# Patient Record
Sex: MALE | Race: WHITE | Employment: UNEMPLOYED | URBAN - METROPOLITAN AREA
[De-identification: names, ages, dates, MRNs, and addresses within clinical notes are randomized per-mention and may not be internally consistent; named-entity substitution may affect disease eponyms.]

---

## 2021-10-03 ENCOUNTER — TRANSFERRED RECORDS (OUTPATIENT)
Dept: HEALTH INFORMATION MANAGEMENT | Facility: CLINIC | Age: 21
End: 2021-10-03
Payer: COMMERCIAL

## 2021-10-06 ENCOUNTER — TRANSFERRED RECORDS (OUTPATIENT)
Dept: HEALTH INFORMATION MANAGEMENT | Facility: CLINIC | Age: 21
End: 2021-10-06

## 2021-11-02 ENCOUNTER — TRANSFERRED RECORDS (OUTPATIENT)
Dept: HEALTH INFORMATION MANAGEMENT | Facility: CLINIC | Age: 21
End: 2021-11-02

## 2021-11-04 ENCOUNTER — TELEPHONE (OUTPATIENT)
Dept: OTOLARYNGOLOGY | Facility: CLINIC | Age: 21
End: 2021-11-04

## 2021-11-04 ENCOUNTER — CARE COORDINATION (OUTPATIENT)
Dept: OTOLARYNGOLOGY | Facility: CLINIC | Age: 21
End: 2021-11-04
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
## 2021-11-04 NOTE — TELEPHONE ENCOUNTER
Rene (father) confirmed that patient signed an DANY with Located within [facility] for recs to be released to himself, but it will take 10 days. Asked that I contact [facility] to expidete the release so that the patient can send it to me. Notified Rene that it might not work but we can try, however, I still need patient to sign an DANY in case this doesn't work. Rene asked that I email him ([email or web address]) so that he can send me the DANY that patient signed with [facility]. Received a call back from patient's cell phone, mom Robert, called stating ok to email patient at [email or web address].       3:08PM update:  Spoke with Jones, mom, notified that [email or web address] email provided bounced back. Mom provided [email or web address] [email or web address] or  [email or web address], asked that I send it to both as she is not sure which one it is. Provided Robert my direct number (ph. [phone])  and fax ([phone]), Robert will contact Dr Francisco's clinic to see if they can take a verbal and send us the records.       Records Requested  11/04/21 Pending DANY    Facility  [facility]   Dr. Elizabeth Francisco in ENT  [address], [place] [address]  Phone: [phone]  Fx. [phone]    Medical Records  [address] [address]  Ph. [phone]   Fx. [phone]   Outcome email sent to Rene (father) and patient for DANY

## 2021-11-09 NOTE — TELEPHONE ENCOUNTER
FUTURE VISIT INFORMATION      FUTURE VISIT INFORMATION:    Date: 2021    Time:   12:10PM    Location: [facility]  REFERRAL INFORMATION:    Referring provider:      Referring providers clinic:      Reason for visit/diagnosis  TM perforation on 10/1, referred by Dr. Mejias, seen at [facility]    RECORDS REQUESTED FROM:       Clinic name Comments Records Status Imaging Status   [facility]  Medical Records  [facility]  [address] [place] [address]  Ph. [phone]   Fx. [phone] 10/6/2021, 2021 notes from Dr Elizabeth Francisco   10/6/2021 audiogram  Pending DANY from patient - req 21 - received 21     [facility] 2021 note from Jennifer Seth MD   Care everywhere     [facility] Department (Main Dept.)  [address] [place] [address], [address]  [place] [address]  Phone: [phone]  Fax: [phone]  Email: [email or web address] 10/3/2021 ED note   10/3/2021 CT HEAD, CT Head and Facial Bone and CT Cervical Spine    *imaging trackin req 21 - recs received 11/15/21 req 21 - received 21 4PM no signed DANY from patient, called [facility] and spoke with medical records, they are able to release recs for cont' of care but it will take them a few days to get them to me. Will look out for it - Amay   2021 9:41AM received ENT recs, sent to scan. Called [facility] recs, they are the clinic not the hospital. Will need to request recs from the Hospital which is different fax number- Amay   11/15/2021 8:50AM recs received, sent to scan. Sent a 2nd fax for images to be mailed out - Amay   2021 1:24PM called and spoke with [facility], they will process imaging request today - Amay   2021 1:41PM imaging disc received, sent to upload - Amay

## 2021-11-11 NOTE — PROGRESS NOTES
Spoke to patient's father regarding appointment with Dr. Sanders. Patient is set up for 11/19 - audiogram at 10:30 and visit with Dr. Sanders at 12:10 pm. Informed that we received ENT records from [facility]. We are still waiting on other records; from ED and imaging. No further questions at this time.    Angy Elliott RN on 11/11/2021 at 11:01 AM

## 2021-11-16 ENCOUNTER — PRE VISIT (OUTPATIENT)
Dept: OTOLARYNGOLOGY | Facility: CLINIC | Age: 21
End: 2021-11-16
Payer: COMMERCIAL
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
## 2021-11-19 ENCOUNTER — TELEPHONE (OUTPATIENT)
Dept: OTOLARYNGOLOGY | Facility: CLINIC | Age: 21
End: 2021-11-19

## 2021-11-19 ENCOUNTER — OFFICE VISIT (OUTPATIENT)
Dept: AUDIOLOGY | Facility: CLINIC | Age: 21
End: 2021-11-19
Payer: COMMERCIAL

## 2021-11-19 ENCOUNTER — OFFICE VISIT (OUTPATIENT)
Dept: OTOLARYNGOLOGY | Facility: CLINIC | Age: 21
End: 2021-11-19
Payer: COMMERCIAL

## 2021-11-19 DIAGNOSIS — H72.90 TYMPANIC MEMBRANE PERFORATION: Primary | ICD-10-CM

## 2021-11-19 DIAGNOSIS — Z01.10 ENCOUNTER FOR HEARING TEST: ICD-10-CM

## 2021-11-19 DIAGNOSIS — S09.21XA TRAUMATIC TYMPANIC MEMBRANE PERFORATION, RIGHT, INITIAL ENCOUNTER: Primary | ICD-10-CM

## 2021-11-19 PROCEDURE — 92557 COMPREHENSIVE HEARING TEST: CPT | Performed by: AUDIOLOGIST

## 2021-11-19 PROCEDURE — 99204 OFFICE O/P NEW MOD 45 MIN: CPT | Mod: 25 | Performed by: OTOLARYNGOLOGY

## 2021-11-19 PROCEDURE — 92504 EAR MICROSCOPY EXAMINATION: CPT | Performed by: OTOLARYNGOLOGY

## 2021-11-19 PROCEDURE — 92565 STENGER TEST PURE TONE: CPT | Performed by: AUDIOLOGIST

## 2021-11-19 PROCEDURE — 92550 TYMPANOMETRY & REFLEX THRESH: CPT | Mod: 52 | Performed by: AUDIOLOGIST

## 2021-11-19 ASSESSMENT — PAIN SCALES - GENERAL: PAINLEVEL: NO PAIN (0)
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
## 2021-11-19 NOTE — PROGRESS NOTES
AUDIOLOGY REPORT    SUMMARY: Audiology visit completed. See audiogram for results.      RECOMMENDATIONS: Follow-up with ENT.      Neri Tyson.  Licensed Audiologist  [place] #[id number]

## 2021-11-19 NOTE — PATIENT INSTRUCTIONS
1. You were seen in the ENT Clinic today by Dr. Sanders.  If you have any questions or concerns after your appointment, please call   - Option 1: ENT Clinic: [phone]   - Option 2: Rhonda (Dr. Sanders' Nurse): [phone]                    Mckenna(Dr. Sanders' Nurse): [phone]      2.   Plan to return to clinic in March with hearing test    Rhonda Zapata LPN  [facility] - Otolaryngology

## 2021-11-19 NOTE — NURSING NOTE
Chief Complaint   Patient presents with     Consult     tympanic perforation -right ear      There were no vitals taken for this visit.    Jaswant Reyes LPN

## 2022-07-08 ENCOUNTER — TELEPHONE (OUTPATIENT)
Dept: OTOLARYNGOLOGY | Facility: CLINIC | Age: 22
End: 2022-07-08

## 2022-07-08 NOTE — TELEPHONE ENCOUNTER
Spoke with pt's mother regarding possible surgery dates for pt wanting tympanoplasty. Mother states that pt currently is scheduled in [place], [place] for this procedure, and she is really wanting him to come to [place] for the procedure. Reviewed Dr. Sanders' last office visit that stated pt needed to wait at least 6 months before considering surgery due to concussion sx at the time. Mother states that pt has been doing much better with these sx, and now one of the most bothersome sx is that he cannot hear in the perforated ear.     Pt scheduled for 8/12 appt with audiogram. Writer to check with MD and surgery scheduler for surgery availability in August or early September for tympanoplasty. Writer will callback mother on 7/20 or 7/21 to further discuss these dates, and confirm that Dr. Sanders is also OK with this plan. CB number provided if she has any further questions prior to our phone conversation.

## 2022-07-08 NOTE — TELEPHONE ENCOUNTER
[facility] Call Center    Phone Message    May a detailed message be left on voicemail: yes     Reason for Call: Other: Pt's mother is requesting a call back from  or her nurse regarding a few questions and concerns about a raptured typanic membrane. Please give pt's mother a call back to help assist with questions. Thank You!     Action Taken: Message routed to:  [facility] ([facility] ent    Travel Screening: Not Applicable

## 2022-07-13 NOTE — TELEPHONE ENCOUNTER
Spoke with mother that next available surgery dates are 9/21 or 10/5. She states that pt has decided to do surgery in [place], and these dates are too far out for him anyway. She states appreciation for the call and attempting to get the patient in. Confirms cancellation of 8/12 appts. No further questions, they will call back if pt decides he wants surgery with Dr. Sanders.

## 2022-08-09 ENCOUNTER — TELEPHONE (OUTPATIENT)
Dept: OTOLARYNGOLOGY | Facility: CLINIC | Age: 22
End: 2022-08-09

## 2022-08-12 NOTE — TELEPHONE ENCOUNTER
Called pt's mom. Father is not listed on the chart as authorized to talk to. Called mom as pt has an appt this month. Dr. Sanders is booked out until the end of September for surgeries so unfortunately we would not be able to schedule any sooner. Will discuss with mother when she calls back.    Rhonda Zapata LPN

## 2022-08-12 NOTE — TELEPHONE ENCOUNTER
[facility] Call Center    Phone Message    May a detailed message be left on voicemail: yes     Reason for Call: Pt's father calling back for status of message dated 08/09/22, states clinic didn't reach out. please advise.     Action Taken: Message routed to:  [facility] ([facility] ENT    Travel Screening: Not Applicable

## 2022-08-12 NOTE — TELEPHONE ENCOUNTER
Spoke to mom- Ok to talk to Rene(father) but we will need a release signed at the visit in September. I let her know we still have the appt on 9/27 with Dr. Sanders. At that visit we talk about surgery dates. No further questions.    Rhonda Zapata LPN

## 2022-09-09 DIAGNOSIS — S09.21XA TRAUMATIC TYMPANIC MEMBRANE PERFORATION, RIGHT, INITIAL ENCOUNTER: Primary | ICD-10-CM

## 2022-09-13 ENCOUNTER — TELEPHONE (OUTPATIENT)
Dept: OTOLARYNGOLOGY | Facility: CLINIC | Age: 22
End: 2022-09-13

## 2022-09-13 NOTE — TELEPHONE ENCOUNTER
[facility] Call Center    Phone Message    May a detailed message be left on voicemail: yes     Reason for Call: Order(s): Other: Surgery orders for ear  Reason for requested: Jones would like to get surgery scheduled as soon as possible since Dr. Cobos's surgery schedule is booking a ways out.  Date needed: Dr. Sanders  Provider name: when possible.      Action Taken: Message routed to:  [facility] ([facility] ENT    Travel Screening: Not Applicable

## 2022-09-13 NOTE — TELEPHONE ENCOUNTER
Spoke to pt's mom and we are not able to put in surgery order until we see him. We would need to re-exam the ear to determine the surgery and insurance will need to have a recent visit for insurance approval. Mom voiced understanding. No further questions.    Rhonda Zapata LPN
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
## 2022-09-27 ENCOUNTER — TELEPHONE (OUTPATIENT)
Dept: OTOLARYNGOLOGY | Facility: CLINIC | Age: 22
End: 2022-09-27

## 2022-09-27 ENCOUNTER — PREP FOR PROCEDURE (OUTPATIENT)
Dept: OTOLARYNGOLOGY | Facility: CLINIC | Age: 22
End: 2022-09-27

## 2022-09-27 ENCOUNTER — OFFICE VISIT (OUTPATIENT)
Dept: OTOLARYNGOLOGY | Facility: CLINIC | Age: 22
End: 2022-09-27

## 2022-09-27 ENCOUNTER — OFFICE VISIT (OUTPATIENT)
Dept: AUDIOLOGY | Facility: CLINIC | Age: 22
End: 2022-09-27
Payer: COMMERCIAL

## 2022-09-27 VITALS
TEMPERATURE: 98.9 F | DIASTOLIC BLOOD PRESSURE: 65 MMHG | SYSTOLIC BLOOD PRESSURE: 143 MMHG | OXYGEN SATURATION: 98 % | HEART RATE: 71 BPM

## 2022-09-27 DIAGNOSIS — H90.11 CONDUCTIVE HEARING LOSS OF RIGHT EAR WITH UNRESTRICTED HEARING OF LEFT EAR: Primary | ICD-10-CM

## 2022-09-27 DIAGNOSIS — H90.11 CONDUCTIVE HEARING LOSS OF RIGHT EAR WITH UNRESTRICTED HEARING OF LEFT EAR: ICD-10-CM

## 2022-09-27 DIAGNOSIS — S09.21XA TRAUMATIC TYMPANIC MEMBRANE PERFORATION, RIGHT, INITIAL ENCOUNTER: Primary | ICD-10-CM

## 2022-09-27 DIAGNOSIS — H72.90 PERFORATION OF TYMPANIC MEMBRANE, UNSPECIFIED LATERALITY: Primary | ICD-10-CM

## 2022-09-27 PROCEDURE — 92557 COMPREHENSIVE HEARING TEST: CPT | Performed by: AUDIOLOGIST

## 2022-09-27 PROCEDURE — 99215 OFFICE O/P EST HI 40 MIN: CPT | Mod: 25 | Performed by: OTOLARYNGOLOGY

## 2022-09-27 PROCEDURE — 92550 TYMPANOMETRY & REFLEX THRESH: CPT | Mod: 52 | Performed by: AUDIOLOGIST

## 2022-09-27 PROCEDURE — 92504 EAR MICROSCOPY EXAMINATION: CPT | Mod: GC | Performed by: OTOLARYNGOLOGY

## 2022-09-27 RX ORDER — IBUPROFEN 800 MG/1
800 TABLET, FILM COATED ORAL 3 TIMES DAILY PRN
COMMUNITY
Start: 2022-09-23 | End: 2023-01-10

## 2022-09-27 RX ORDER — CEFUROXIME SODIUM 1.5 G/16ML
1.5 INJECTION, POWDER, FOR SOLUTION INTRAVENOUS
Status: CANCELLED | OUTPATIENT
Start: 2022-09-27

## 2022-09-27 RX ORDER — HYDROXYZINE PAMOATE 25 MG/1
CAPSULE ORAL
COMMUNITY
Start: 2022-09-23 | End: 2022-11-21

## 2022-09-27 RX ORDER — DEXAMETHASONE SODIUM PHOSPHATE 4 MG/ML
10 INJECTION, SOLUTION INTRA-ARTICULAR; INTRALESIONAL; INTRAMUSCULAR; INTRAVENOUS; SOFT TISSUE ONCE
Status: CANCELLED | OUTPATIENT
Start: 2022-09-27 | End: 2022-09-27

## 2022-09-27 RX ORDER — OXYCODONE HYDROCHLORIDE 5 MG/1
TABLET ORAL
COMMUNITY
Start: 2022-09-23 | End: 2022-11-21

## 2022-09-27 RX ORDER — CEFUROXIME SODIUM 1.5 G/16ML
1.5 INJECTION, POWDER, FOR SOLUTION INTRAVENOUS SEE ADMIN INSTRUCTIONS
Status: CANCELLED | OUTPATIENT
Start: 2022-09-27

## 2022-09-27 ASSESSMENT — PAIN SCALES - GENERAL: PAINLEVEL: NO PAIN (0)

## 2022-09-27 NOTE — TELEPHONE ENCOUNTER
Records Requested  09/27/22    Facility  Dr. Sabino Tomas MD - Ear, Nose & Throat  [address], [place] [address]  Phone: [phone] (Fax)  MEd recs fx, [phone]   Outcome Called facility, left a message for a fax number for medical records - [facility]        Records Requested  09/27/22    Facility  [facility] Department (Main Dept.)  [address] [place] [address], [address]  [place] [address]  Phone: [phone]  Fax: [phone]   Outcome Sent a fax for op report from Dr Tomas    9/28/22 received recs, sent to scan. Copy emailed to Rhonda Elise
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
## 2022-09-27 NOTE — NURSING NOTE
Chief Complaint   Patient presents with     RECHECK     Surgical consult  TM repair . Unable to take weight discuss . Has Crutches and leg brace     Blood pressure (!) 143/65, pulse 71, temperature 98.9  F (37.2  C), SpO2 98 %.    .  Jaswant Reyes LPN

## 2022-09-27 NOTE — PATIENT INSTRUCTIONS
1. You were seen in the ENT Clinic today by Dr. Sanders.  If you have any questions or concerns after your appointment, please call   - Option 1: ENT Clinic: [phone]   - Option 2: Rhonda (Dr. Sanders' Nurse): [phone]                    (Dr. Sanders' Nurse): [phone]      2.   Plan to return to clinic 3 weeks post    How to Contact Us:  Send a [employer] message to your provider. Our team will respond to you via [employer]. Occasionally, we will need to call you to get further information.  For urgent matters (Monday-Friday), call the ENT Clinic: [phone] and speak with a call center team member - they will route your call appropriately.   If you'd like to speak directly with a nurse, please find our contact information below. We do our best to check voicemail frequently throughout the day, and will work to call you back within 1-2 days. For urgent matters, please use the general clinic phone numbers listed above.       Rhonda Zapata LPN  [facility] - Otolaryngology     Surgery Teaching    1. Someone from our scheduling department will call you within approximately one week to get you scheduled with your provider for surgery. If no one has called you in one week, please notify us.    2. You must have a physical exam (called  history and physical ) within 30 days of surgery. You may complete this with your primary care provider.   A. If your provider is outside of the [facility] network please have them complete the preoperative forms provided to you in the surgery packet you will be mailed and be sure to have your provider fax them to the appropriate location prior to surgery. For surgery at the [facility] the fax number [phone]. For surgery at the [facility] the fax number is [phone].  B. In some cases we may have you see our Preoperative Assessment Center. If we have expressed this to you, our  will set up your appointment with them when they call to set up your surgery.    3. If you're going home on  "the same day as your procedure (surgery):  1 to 2 days before your procedure, take an at-home, rapid antigen test. You can buy these at many pharmacy stores. Or you can order free, at-home tests at [employer]/tests. If you can't find an at-home, rapid antigen test, please schedule a PCR test with [facility] by calling [employer], or visiting [employer]/covid19. We can't accept tests that are more than 4 days old.  If your test is negative, please take a photo of the test. Show the photo to the nurse when you come in for your procedure.  If your test is positive, please see the \"If your test shows you have COVID-19\" section on this page        If you're staying overnight in the hospital:4 days before your procedure, please get a PCR test from a lab.  To schedule a PCR test with [facility], call [phone]. Or, visit   [employer]/resources/covid19.  If your test is negative, please ask the testing location to fax your results to us at [phone].  If your test is positive, please tell your surgeon's office right away. A positive test means that you have COVID-19. We'll probably have to postpone your admission, surgery or procedure. Your care team will discuss this with you. After that, we'll let you know what to do and when you can re-schedule.    4. For same-day surgery, you must arrange for an adult to take you home from the Center. An adult must stay with you for the first 24 hours after surgery. You cannot drive for 24 hours.     5. Ask your doctor what medicines are safe before surgery. For over the counter medications and supplements it is advised that you do NOT TAKE MOTRIN, IBUPROFEN, ASPIRIN, ALEVE, GARLIC SUPPLEMENTS or FISH OIL x 7 days prior to surgery (to prevent excess bleeding and bruising at time of surgery). If your provider advises you to take any medication the morning of surgery you should take this with a sip of water.    6. A few days prior to " surgery a nurse will call you to review your health history and instructions for before and after surgery. They will give you your final arrival time based upon your scheduled arrival time for surgery.    7. Call the surgical team if there's any change in your health prior to surgery. Things you should call for include but are not limited to signs of a cold or the flu (sore throat, runny nose, cough, rash, fever). Other things to notify them for is for any open wounds (cuts, scrapes, scratches) near to the surgery site.    8. If you drink alcohol, stop drinking alcohol at least 24 hours before surgery.    9. If you smoke, stop or at least cut down on smoking 24 hours before surgery.    10.Take a bath or shower the night before and the morning of surgery (as told by your surgeon). Use an antiseptic soap. If your doctor does not give you special soap, buy Hibiclens or Elina-Stat at the drug store or ask the pharmacist to suggest a brand. You will wash with this from the neck down, washing your hair and face as you would normally.   A. When you are done with your shower please be sure to use clean towels to dry with, have clean linens on your bed, and put on clean clothes each time.   B. DO NOT put on lotion, powder, perfume, deodorant or make-up after bathing.    11. You can eat a normal meal the night before surgery. Do not eat any solid foods or drink any milk products for 8 hours before surgery.     12. You may drink clear liquids until 2 hours before surgery. Clear liquids include water, Gatorade, apple juice and liquids you can see through.    13. No eating or drinking 2 hours prior to surgery until after surgery. Your post op team will review any diet limitations you might have and when you can start eating and drinking again after surgery.

## 2022-09-27 NOTE — PROGRESS NOTES
AUDIOLOGY REPORT    SUMMARY: Audiology visit completed. See audiogram for results.      RECOMMENDATIONS: Follow-up with ENT.      Neri Tyson.  Licensed Audiologist  [place] #[id number]

## 2022-10-03 ENCOUNTER — TELEPHONE (OUTPATIENT)
Dept: OTOLARYNGOLOGY | Facility: CLINIC | Age: 22
End: 2022-10-03

## 2022-10-03 DIAGNOSIS — S09.21XA TRAUMATIC TYMPANIC MEMBRANE PERFORATION, RIGHT, INITIAL ENCOUNTER: Primary | ICD-10-CM

## 2022-10-03 NOTE — TELEPHONE ENCOUNTER
Called his dad to let him know we would need to get a CT scan done before surgery. Gave phone number to schedule. Dad was really pushing to get surgery very soon. I explained we will call as soon as we can as there is a lot of coordination needs to happen. No further questions.    Rhonda Zapata LPN

## 2022-10-06 ENCOUNTER — TELEPHONE (OUTPATIENT)
Dept: OTOLARYNGOLOGY | Facility: CLINIC | Age: 22
End: 2022-10-06

## 2022-10-06 NOTE — TELEPHONE ENCOUNTER
Left patient a voicemail to schedule Left postauricular revision tympanoplasty (Left)  with Dr. Tommy Marshall on 10/6/2022 at 2:49 PM

## 2022-10-07 ENCOUNTER — ANCILLARY PROCEDURE (OUTPATIENT)
Dept: CT IMAGING | Facility: CLINIC | Age: 22
End: 2022-10-07
Attending: OTOLARYNGOLOGY
Payer: COMMERCIAL

## 2022-10-07 DIAGNOSIS — S09.21XA TRAUMATIC TYMPANIC MEMBRANE PERFORATION, RIGHT, INITIAL ENCOUNTER: ICD-10-CM

## 2022-10-07 PROBLEM — H72.90 PERFORATION OF TYMPANIC MEMBRANE, UNSPECIFIED LATERALITY: Status: ACTIVE | Noted: 2022-10-07

## 2022-10-07 PROCEDURE — 70480 CT ORBIT/EAR/FOSSA W/O DYE: CPT

## 2022-10-07 NOTE — TELEPHONE ENCOUNTER
Called patient to schedule surgery with Dr. Sanders    Date of Surgery: 12/12    Location of surgery: [facility]    Pre-Op H&P: PCP    Pre/Post Imaging:  Not Applicable    Discussed COVID-19 Testing: Yes    Post-Op Appt Date: 3 weeks    Surgery Packet Mailed: 10/10      Additional comments: NETTIE Marshall on 10/7/2022 at 10:57 AM

## 2022-10-10 NOTE — TELEPHONE ENCOUNTER
Rescheduled patients surgery to 11/23. Patient needs 3 week post op    Jocelyn Marshall, Surgery Scheduler 10/10/2022 at 4:05 PM

## 2022-11-01 ENCOUNTER — TELEPHONE (OUTPATIENT)
Dept: OTOLARYNGOLOGY | Facility: CLINIC | Age: 22
End: 2022-11-01

## 2022-11-23 ENCOUNTER — HOSPITAL ENCOUNTER (OUTPATIENT)
Facility: AMBULATORY SURGERY CENTER | Age: 22
Discharge: HOME | End: 2022-11-23
Attending: OTOLARYNGOLOGY | Admitting: OTOLARYNGOLOGY
Payer: COMMERCIAL

## 2022-11-23 ENCOUNTER — ANESTHESIA EVENT (OUTPATIENT)
Dept: SURGERY | Facility: AMBULATORY SURGERY CENTER | Age: 22
End: 2022-11-23
Payer: COMMERCIAL

## 2022-11-23 ENCOUNTER — ANESTHESIA (OUTPATIENT)
Dept: SURGERY | Facility: AMBULATORY SURGERY CENTER | Age: 22
End: 2022-11-23
Payer: COMMERCIAL

## 2022-11-23 VITALS
WEIGHT: 185 LBS | DIASTOLIC BLOOD PRESSURE: 71 MMHG | TEMPERATURE: 97.9 F | HEART RATE: 70 BPM | RESPIRATION RATE: 16 BRPM | OXYGEN SATURATION: 97 % | HEIGHT: 75 IN | BODY MASS INDEX: 23 KG/M2 | SYSTOLIC BLOOD PRESSURE: 137 MMHG

## 2022-11-23 DIAGNOSIS — H72.90 PERFORATION OF TYMPANIC MEMBRANE, UNSPECIFIED LATERALITY: ICD-10-CM

## 2022-11-23 DIAGNOSIS — G89.18 POST-OP PAIN: Primary | ICD-10-CM

## 2022-11-23 PROCEDURE — 69631 REPAIR EARDRUM STRUCTURES: CPT | Mod: RT

## 2022-11-23 PROCEDURE — 69631 REPAIR EARDRUM STRUCTURES: CPT | Mod: 22 | Performed by: OTOLARYNGOLOGY

## 2022-11-23 RX ORDER — LIDOCAINE HYDROCHLORIDE AND EPINEPHRINE 10; 10 MG/ML; UG/ML
INJECTION, SOLUTION INFILTRATION; PERINEURAL PRN
Status: DISCONTINUED | OUTPATIENT
Start: 2022-11-23 | End: 2022-11-23 | Stop reason: HOSPADM

## 2022-11-23 RX ORDER — ACETAMINOPHEN 325 MG/1
975 TABLET ORAL ONCE
Status: DISCONTINUED | OUTPATIENT
Start: 2022-11-23 | End: 2022-11-24 | Stop reason: HOSPADM

## 2022-11-23 RX ORDER — HYDROCODONE BITARTRATE AND ACETAMINOPHEN 5; 325 MG/1; MG/1
1 TABLET ORAL
Status: COMPLETED | OUTPATIENT
Start: 2022-11-23 | End: 2022-11-23

## 2022-11-23 RX ORDER — PROPOFOL 10 MG/ML
INJECTION, EMULSION INTRAVENOUS CONTINUOUS PRN
Status: DISCONTINUED | OUTPATIENT
Start: 2022-11-23 | End: 2022-11-23

## 2022-11-23 RX ORDER — DEXMEDETOMIDINE HYDROCHLORIDE 4 UG/ML
INJECTION, SOLUTION INTRAVENOUS PRN
Status: DISCONTINUED | OUTPATIENT
Start: 2022-11-23 | End: 2022-11-23

## 2022-11-23 RX ORDER — ONDANSETRON 4 MG/1
4 TABLET, ORALLY DISINTEGRATING ORAL EVERY 8 HOURS PRN
Qty: 4 TABLET | Refills: 0 | Status: SHIPPED | OUTPATIENT
Start: 2022-11-23 | End: 2022-12-13

## 2022-11-23 RX ORDER — FENTANYL CITRATE 50 UG/ML
50 INJECTION, SOLUTION INTRAMUSCULAR; INTRAVENOUS EVERY 5 MIN PRN
Status: DISCONTINUED | OUTPATIENT
Start: 2022-11-23 | End: 2022-11-24 | Stop reason: HOSPADM

## 2022-11-23 RX ORDER — LIDOCAINE HYDROCHLORIDE 20 MG/ML
INJECTION, SOLUTION INFILTRATION; PERINEURAL PRN
Status: DISCONTINUED | OUTPATIENT
Start: 2022-11-23 | End: 2022-11-23

## 2022-11-23 RX ORDER — CIPROFLOXACIN AND DEXAMETHASONE 3; 1 MG/ML; MG/ML
SUSPENSION/ DROPS AURICULAR (OTIC) PRN
Status: DISCONTINUED | OUTPATIENT
Start: 2022-11-23 | End: 2022-11-23 | Stop reason: HOSPADM

## 2022-11-23 RX ORDER — FENTANYL CITRATE 50 UG/ML
25 INJECTION, SOLUTION INTRAMUSCULAR; INTRAVENOUS EVERY 5 MIN PRN
Status: DISCONTINUED | OUTPATIENT
Start: 2022-11-23 | End: 2022-11-24 | Stop reason: HOSPADM

## 2022-11-23 RX ORDER — ONDANSETRON 4 MG/1
4 TABLET, ORALLY DISINTEGRATING ORAL EVERY 30 MIN PRN
Status: DISCONTINUED | OUTPATIENT
Start: 2022-11-23 | End: 2022-11-24 | Stop reason: HOSPADM

## 2022-11-23 RX ORDER — HYDROCODONE BITARTRATE AND ACETAMINOPHEN 5; 325 MG/1; MG/1
1 TABLET ORAL EVERY 6 HOURS PRN
Qty: 30 TABLET | Refills: 0 | Status: SHIPPED | OUTPATIENT
Start: 2022-11-23 | End: 2022-12-13

## 2022-11-23 RX ORDER — HYDROCODONE BITARTRATE AND ACETAMINOPHEN 5; 325 MG/1; MG/1
1 TABLET ORAL EVERY 6 HOURS PRN
Qty: 12 TABLET | Refills: 0 | Status: SHIPPED | OUTPATIENT
Start: 2022-11-23 | End: 2022-11-23

## 2022-11-23 RX ORDER — MEPERIDINE HYDROCHLORIDE 25 MG/ML
12.5 INJECTION INTRAMUSCULAR; INTRAVENOUS; SUBCUTANEOUS
Status: DISCONTINUED | OUTPATIENT
Start: 2022-11-23 | End: 2022-11-24 | Stop reason: HOSPADM

## 2022-11-23 RX ORDER — CEFUROXIME SODIUM 1.5 G/16ML
1.5 INJECTION, POWDER, FOR SOLUTION INTRAVENOUS
Status: COMPLETED | OUTPATIENT
Start: 2022-11-23 | End: 2022-11-23

## 2022-11-23 RX ORDER — DEXAMETHASONE SODIUM PHOSPHATE 10 MG/ML
10 INJECTION, SOLUTION INTRAMUSCULAR; INTRAVENOUS ONCE
Status: COMPLETED | OUTPATIENT
Start: 2022-11-23 | End: 2022-11-23

## 2022-11-23 RX ORDER — SODIUM CHLORIDE, SODIUM LACTATE, POTASSIUM CHLORIDE, CALCIUM CHLORIDE 600; 310; 30; 20 MG/100ML; MG/100ML; MG/100ML; MG/100ML
INJECTION, SOLUTION INTRAVENOUS CONTINUOUS PRN
Status: DISCONTINUED | OUTPATIENT
Start: 2022-11-23 | End: 2022-11-23

## 2022-11-23 RX ORDER — ONDANSETRON 2 MG/ML
4 INJECTION INTRAMUSCULAR; INTRAVENOUS EVERY 30 MIN PRN
Status: DISCONTINUED | OUTPATIENT
Start: 2022-11-23 | End: 2022-11-24 | Stop reason: HOSPADM

## 2022-11-23 RX ORDER — HYDROMORPHONE HYDROCHLORIDE 1 MG/ML
0.2 INJECTION, SOLUTION INTRAMUSCULAR; INTRAVENOUS; SUBCUTANEOUS EVERY 5 MIN PRN
Status: DISCONTINUED | OUTPATIENT
Start: 2022-11-23 | End: 2022-11-24 | Stop reason: HOSPADM

## 2022-11-23 RX ORDER — HYDROMORPHONE HYDROCHLORIDE 1 MG/ML
0.4 INJECTION, SOLUTION INTRAMUSCULAR; INTRAVENOUS; SUBCUTANEOUS EVERY 5 MIN PRN
Status: DISCONTINUED | OUTPATIENT
Start: 2022-11-23 | End: 2022-11-24 | Stop reason: HOSPADM

## 2022-11-23 RX ORDER — CEFUROXIME SODIUM 1.5 G/16ML
1.5 INJECTION, POWDER, FOR SOLUTION INTRAVENOUS SEE ADMIN INSTRUCTIONS
Status: DISCONTINUED | OUTPATIENT
Start: 2022-11-23 | End: 2022-11-23 | Stop reason: HOSPADM

## 2022-11-23 RX ORDER — ONDANSETRON 2 MG/ML
INJECTION INTRAMUSCULAR; INTRAVENOUS PRN
Status: DISCONTINUED | OUTPATIENT
Start: 2022-11-23 | End: 2022-11-23

## 2022-11-23 RX ORDER — HALOPERIDOL 5 MG/ML
1 INJECTION INTRAMUSCULAR
Status: DISCONTINUED | OUTPATIENT
Start: 2022-11-23 | End: 2022-11-24 | Stop reason: HOSPADM

## 2022-11-23 RX ORDER — AMOXICILLIN 250 MG
1-2 CAPSULE ORAL 2 TIMES DAILY
Qty: 30 TABLET | Refills: 0 | Status: SHIPPED | OUTPATIENT
Start: 2022-11-23 | End: 2023-01-10

## 2022-11-23 RX ORDER — FENTANYL CITRATE 50 UG/ML
INJECTION, SOLUTION INTRAMUSCULAR; INTRAVENOUS PRN
Status: DISCONTINUED | OUTPATIENT
Start: 2022-11-23 | End: 2022-11-23

## 2022-11-23 RX ORDER — PROPOFOL 10 MG/ML
INJECTION, EMULSION INTRAVENOUS PRN
Status: DISCONTINUED | OUTPATIENT
Start: 2022-11-23 | End: 2022-11-23

## 2022-11-23 RX ORDER — CEFUROXIME AXETIL 250 MG/1
250 TABLET ORAL 2 TIMES DAILY
Qty: 14 TABLET | Refills: 0 | Status: SHIPPED | OUTPATIENT
Start: 2022-11-23 | End: 2022-11-30

## 2022-11-23 RX ORDER — ALBUTEROL SULFATE 0.83 MG/ML
2.5 SOLUTION RESPIRATORY (INHALATION) EVERY 4 HOURS PRN
Status: DISCONTINUED | OUTPATIENT
Start: 2022-11-23 | End: 2022-11-24 | Stop reason: HOSPADM

## 2022-11-23 RX ADMIN — PROPOFOL 50 MG: 10 INJECTION, EMULSION INTRAVENOUS at 07:27

## 2022-11-23 RX ADMIN — CEFUROXIME SODIUM 1.5 G: 1.5 INJECTION, POWDER, FOR SOLUTION INTRAVENOUS at 07:30

## 2022-11-23 RX ADMIN — FENTANYL CITRATE 50 MCG: 50 INJECTION, SOLUTION INTRAMUSCULAR; INTRAVENOUS at 13:06

## 2022-11-23 RX ADMIN — DEXMEDETOMIDINE HYDROCHLORIDE 8 MCG: 4 INJECTION, SOLUTION INTRAVENOUS at 11:28

## 2022-11-23 RX ADMIN — FENTANYL CITRATE 50 MCG: 50 INJECTION, SOLUTION INTRAMUSCULAR; INTRAVENOUS at 13:12

## 2022-11-23 RX ADMIN — HYDROMORPHONE HYDROCHLORIDE 0.2 MG: 1 INJECTION, SOLUTION INTRAMUSCULAR; INTRAVENOUS; SUBCUTANEOUS at 13:38

## 2022-11-23 RX ADMIN — PROPOFOL 200 MG: 10 INJECTION, EMULSION INTRAVENOUS at 07:25

## 2022-11-23 RX ADMIN — PROPOFOL 50 MG: 10 INJECTION, EMULSION INTRAVENOUS at 12:32

## 2022-11-23 RX ADMIN — LIDOCAINE HYDROCHLORIDE 100 MG: 20 INJECTION, SOLUTION INFILTRATION; PERINEURAL at 07:24

## 2022-11-23 RX ADMIN — DEXMEDETOMIDINE HYDROCHLORIDE 8 MCG: 4 INJECTION, SOLUTION INTRAVENOUS at 07:50

## 2022-11-23 RX ADMIN — PROPOFOL 50 MG: 10 INJECTION, EMULSION INTRAVENOUS at 07:26

## 2022-11-23 RX ADMIN — FENTANYL CITRATE 25 MCG: 50 INJECTION, SOLUTION INTRAMUSCULAR; INTRAVENOUS at 07:51

## 2022-11-23 RX ADMIN — SODIUM CHLORIDE, SODIUM LACTATE, POTASSIUM CHLORIDE, CALCIUM CHLORIDE: 600; 310; 30; 20 INJECTION, SOLUTION INTRAVENOUS at 11:30

## 2022-11-23 RX ADMIN — FENTANYL CITRATE 50 MCG: 50 INJECTION, SOLUTION INTRAMUSCULAR; INTRAVENOUS at 08:05

## 2022-11-23 RX ADMIN — Medication 0.5 MG: at 12:09

## 2022-11-23 RX ADMIN — PROPOFOL 40 MG: 10 INJECTION, EMULSION INTRAVENOUS at 12:04

## 2022-11-23 RX ADMIN — PROPOFOL 200 MCG/KG/MIN: 10 INJECTION, EMULSION INTRAVENOUS at 07:26

## 2022-11-23 RX ADMIN — HYDROMORPHONE HYDROCHLORIDE 0.2 MG: 1 INJECTION, SOLUTION INTRAMUSCULAR; INTRAVENOUS; SUBCUTANEOUS at 13:27

## 2022-11-23 RX ADMIN — PROPOFOL 40 MG: 10 INJECTION, EMULSION INTRAVENOUS at 09:33

## 2022-11-23 RX ADMIN — SODIUM CHLORIDE, SODIUM LACTATE, POTASSIUM CHLORIDE, CALCIUM CHLORIDE: 600; 310; 30; 20 INJECTION, SOLUTION INTRAVENOUS at 06:37

## 2022-11-23 RX ADMIN — PROPOFOL 50 MG: 10 INJECTION, EMULSION INTRAVENOUS at 07:28

## 2022-11-23 RX ADMIN — HYDROCODONE BITARTRATE AND ACETAMINOPHEN 1 TABLET: 5; 325 TABLET ORAL at 13:39

## 2022-11-23 RX ADMIN — CEFUROXIME SODIUM 1.5 G: 1.5 INJECTION, POWDER, FOR SOLUTION INTRAVENOUS at 11:30

## 2022-11-23 RX ADMIN — DEXMEDETOMIDINE HYDROCHLORIDE 8 MCG: 4 INJECTION, SOLUTION INTRAVENOUS at 09:33

## 2022-11-23 RX ADMIN — FENTANYL CITRATE 25 MCG: 50 INJECTION, SOLUTION INTRAMUSCULAR; INTRAVENOUS at 07:24

## 2022-11-23 RX ADMIN — Medication 0.5 MG: at 10:00

## 2022-11-23 RX ADMIN — DEXMEDETOMIDINE HYDROCHLORIDE 8 MCG: 4 INJECTION, SOLUTION INTRAVENOUS at 12:04

## 2022-11-23 RX ADMIN — ONDANSETRON 4 MG: 2 INJECTION INTRAMUSCULAR; INTRAVENOUS at 07:17

## 2022-11-23 RX ADMIN — PROPOFOL 40 MG: 10 INJECTION, EMULSION INTRAVENOUS at 11:27

## 2022-11-23 RX ADMIN — DEXAMETHASONE SODIUM PHOSPHATE 10 MG: 10 INJECTION, SOLUTION INTRAMUSCULAR; INTRAVENOUS at 07:31

## 2022-11-23 NOTE — ANESTHESIA CARE TRANSFER NOTE
Patient: Saul Wilson    Procedure: Procedure(s):  right postauricular revision tympanoplasty       Diagnosis: Perforation of tympanic membrane, unspecified laterality [H72.90]  Diagnosis Additional Information: No value filed.    Anesthesia Type:   No value filed.     Note:    Oropharynx: oropharynx clear of all foreign objects and spontaneously breathing  Level of Consciousness: awake  Oxygen Supplementation: face mask  Level of Supplemental Oxygen (L/min / FiO2): 5  Independent Airway: airway patency satisfactory and stable  Dentition: dentition unchanged  Vital Signs Stable: post-procedure vital signs reviewed and stable  Report to RN Given: handoff report given  Patient transferred to: PACU  Comments: Uneventful transport with O2 face mask 4 lm  Report to RN - Jailyn  Exchanging well; color natl  Pt responds appropriately to command  IV patent  Lips/teeth/dentition as preop status  Questions answered    Handoff Report: Identifed the Patient, Identified the Reponsible Provider, Reviewed the pertinent medical history, Discussed the surgical course, Reviewed Intra-OP anesthesia mangement and issues during anesthesia, Set expectations for post-procedure period and Allowed opportunity for questions and acknowledgement of understanding      Vitals:  Vitals Value Taken Time   /64 11/23/22 1244   Temp 36.6  C (97.8  F) 11/23/22 1244   Pulse 76 11/23/22 1248   Resp 14 11/23/22 1248   SpO2 97 % 11/23/22 1248   Vitals shown include unvalidated device data.    Electronically Signed By: LUCY ERICKSON CRNA  November 23, 2022  12:50 PM

## 2022-11-23 NOTE — ANESTHESIA POSTPROCEDURE EVALUATION
Patient: Saul Wilson    Procedure: Procedure(s):  right postauricular revision tympanoplasty       Anesthesia Type:  General    Note:  Disposition: Outpatient   Postop Pain Control: Uneventful            Sign Out: Well controlled pain   PONV: No   Neuro/Psych: Uneventful            Sign Out: Acceptable/Baseline neuro status   Airway/Respiratory: Uneventful            Sign Out: Acceptable/Baseline resp. status   CV/Hemodynamics: Uneventful            Sign Out: Acceptable CV status; No obvious hypovolemia; No obvious fluid overload   Other NRE: NONE   DID A NON-ROUTINE EVENT OCCUR? No           Last vitals:  Vitals Value Taken Time   /75 11/23/22 1336   Temp 36.3  C (97.4  F) 11/23/22 1336   Pulse 75 11/23/22 1336   Resp 16 11/23/22 1336   SpO2 94 % 11/23/22 1335   Vitals shown include unvalidated device data.    Electronically Signed By: Duane Mejia MD  November 23, 2022  3:18 PM

## 2022-11-23 NOTE — BRIEF OP NOTE
[facility] [place]    Brief Operative Note    Pre-operative diagnosis: Perforation of tympanic membrane, unspecified laterality [H72.90]  Post-operative diagnosis Same as pre-operative diagnosis    Procedure: Procedure(s):  right postauricular revision tympanoplasty  Surgeon: Surgeon(s) and Role:     * Mayra Sanders MD - Primary  Anesthesia: General   Estimated Blood Loss: Less than 50 ml    Drains: None  Specimens: * No specimens in log *  Findings:   Remnant annulus exposed superiorly, anteriorly, and inferiorly. Location superior to and overlying malleus with prior Biodesign with significant scarring and inflammation. Difficult visualization of anterior TM remnant due to bony overhang from TMJ. Temporalis fascia graft harvested and used for tympanoplasty   Complications: None.  Implants: * No implants in log *

## 2022-11-23 NOTE — ANESTHESIA PREPROCEDURE EVALUATION
Anesthesia Pre-Procedure Evaluation    Patient: Saul Wilson   MRN: [id number] : 2000        Procedure : Procedure(s):  right postauricular revision tympanoplasty          No past medical history on file.   History reviewed. No pertinent surgical history.   No Known Allergies   Social History     Tobacco Use     Smoking status: Some Days     Smokeless tobacco: Never   Substance Use Topics     Alcohol use: Not on file     Comment: 1 glass per month      Wt Readings from Last 1 Encounters:   22 83.9 kg (185 lb)        Anesthesia Evaluation   Pt has had prior anesthetic.     No history of anesthetic complications       ROS/MED HX  ENT/Pulmonary: Comment: Right tympanic membrane perf      Neurologic:  - neg neurologic ROS     Cardiovascular:  - neg cardiovascular ROS     METS/Exercise Tolerance: >4 METS    Hematologic:       Musculoskeletal:       GI/Hepatic:    (-) GERD   Renal/Genitourinary:       Endo:       Psychiatric/Substance Use: Comment: ADHD & learning disabilities    (+) Recreational drug usage: Cannabis.    Infectious Disease:       Malignancy:       Other:            Physical Exam    Airway  airway exam normal           Respiratory Devices and Support         Dental       (+) upper braces and lower braces      Cardiovascular   cardiovascular exam normal          Pulmonary   pulmonary exam normal                OUTSIDE LABS:  CBC: No results found for: WBC, HGB, HCT, PLT  BMP: No results found for: NA, POTASSIUM, CHLORIDE, CO2, BUN, CR, GLC  COAGS: No results found for: PTT, INR, FIBR  POC: No results found for: BGM, HCG, HCGS  HEPATIC: No results found for: ALBUMIN, PROTTOTAL, ALT, AST, GGT, ALKPHOS, BILITOTAL, BILIDIRECT, MARKO  OTHER: No results found for: PH, LACT, A1C, MARZENA, PHOS, MAG, LIPASE, AMYLASE, TSH, T4, T3, CRP, SED    Anesthesia Plan    ASA Status:  2   NPO Status:  NPO Appropriate    Anesthesia Type: General.     - Airway: ETT   Induction: Intravenous.   Maintenance:  Balanced.   Techniques and Equipment:       - Drips/Meds: Remifentanil, Dexmed. bolus     Consents    Anesthesia Plan(s) and associated risks, benefits, and realistic alternatives discussed. Questions answered and patient/representative(s) expressed understanding.    - Discussed:     - Discussed with:  Patient         Postoperative Care    Pain management: IV analgesics, Oral pain medications, Multi-modal analgesia.   PONV prophylaxis: Ondansetron (or other 5HT-3), Dexamethasone or Solumedrol     Comments:                Duane Mejia MD

## 2022-11-23 NOTE — DISCHARGE INSTRUCTIONS
[facility]  Home Care Following Anesthesia  For 24 hours after surgery:  Get plenty of rest.  A responsible adult must stay with you for at least 24 hours after you leave the surgery center.  Do not drive or use heavy equipment.  If you have weakness or tingling, don't drive or use heavy equipment until this feeling goes away.   Do not drink alcohol.   Avoid strenuous or risky activities.  Ask for help when climbing stairs.  You may feel lightheaded.  IF so, sit for a few minutes before standing.  Have someone help you get up.   If you have nausea (feel sick to your stomach): Drink only clear liquids such as apple juice, ginger ale, broth or 7-Up.  Rest may also help.  Be sure to drink enough fluids.  Move to a regular diet as you feel able.   You may have a slight fever.  Call the doctor if your fever is over 100 F (37.7 C) (taken under the tongue) or lasts longer than 24 hours.  You may have a dry mouth, a sore throat, muscle aches or trouble sleeping. These should go away after 24 hours.  Do not make important or legal decisions.   It is recommended to avoid smoking.               Tips for taking pain medications  To get the best pain relief possible, remember these points:  Take pain medications as directed, before pain becomes severe.  Pain medication can upset your stomach: taking it with food may help.  Constipation is a common side effect of pain medication. Drink plenty of  fluids.  Eat foods high in fiber. Take a stool softener if recommended by your doctor or pharmacist.  Do not drink alcohol, drive or operate machinery while taking pain medications.  Ask about other ways to control pain, such as with heat, ice or relaxation.    Tylenol/Acetaminophen Consumption  To help encourage the safe use of acetaminophen, the makers of TYLENOL  have lowered the maximum daily dose for single-ingredient Extra Strength TYLENOL  (acetaminophen) products sold in the [place] from 8 pills  per day (4,000 mg) to 6 pills per day (3,000 mg). The dosing interval has also changed from 2 pills every 4-6 hours to 2 pills every 6 hours.  If you feel your pain relief is insufficient, you may take Tylenol/Acetaminophen in addition to your narcotic pain medication.   Be careful not to exceed 3,000 mg of Tylenol/Acetaminophen in a 24 hour period from all sources.  If you are taking extra strength Tylenol/acetaminophen (500 mg), the maximum dose is 6 tablets in 24 hours.  If you are taking regular strength acetaminophen (325 mg), the maximum dose is 9 tablets in 24 hours.    Call a doctor for any of the following:  Signs of infection (fever, growing tenderness at the surgery site, a large amount of drainage or bleeding, severe pain, foul-smelling drainage, redness, swelling).  It has been over 8 to 10 hours since surgery and you are still not able to urinate (pass water).  Headache for over 24 hours.  Numbness, tingling or weakness the day after surgery (if you had spinal anesthesia).  Signs of Covid-19 infection (temperature over 100 degrees, shortness of breath, cough, loss of taste/smell, generalized body aches, persistent headache, chills, sore throat, nausea/vomiting/diarrhea)  Your doctor is:  Dr. Mayra Sanders, ENT Otolaryngology: [phone]                    Or dial [phone] and ask for the resident on call for:  ENT Otolaryngology  For emergency care, call the:  [facility]:  [phone] (TTY for hearing impaired: [phone])

## 2022-12-04 NOTE — OP NOTE
Date of service:  11/23/22    Preoperative diagnosis:  Right near-total tympanic membrane perforation    Postoperative diagnosis:  Right near-total tympanic membrane perforation    Procedures:  1.  Right lateral technique tympanoplasty (revision) with bony canalplasty  2.  Facial nerve monitoring    Surgeon: Mayra Sanders MD    Resident: Juana Baca MD    Anesthesia:  General endotracheal    EBL:  10 cc    Complications:  None    Findings: Markedly overhanging anterior canal wall and upsloping narrow ear canal making exposure and manipulation of the middle ear and membrane remnants challenging.  There is a very thin remnant annulus present superiorly, anteriorly, and inferiorly and no annulus posteriorly draped with. Location superior to and overlying malleus with prior Biodesign with significant scarring and inflammation.  Additional scarring around ossicles posteriorly.  Very difficult visualization of anterior TM remnant due to bony overhang from TMJ. Temporalis fascia graft harvested and used for lateral graft tympanoplasty.    Indications:  Saul Wilson is a 22 year old male who was found to have a right tympanic membrane perforation from trauma.  He underwent tympanoplasty at an outside facility in [place].  Operative report indicates that bio design graft was used.  Patient has a persistent tympanic membrane perforation with inability to visualize the anterior rim in clinic due to overhanging temporomandibular joint and challenging ear canal anatomy.  Perforation is associated with significant conductive hearing loss.  He does not have active infection.  Risks and benefits of the above procedure were had with the patient and informed consent was obtained in the clinic.  This was confirmed in the preoperative area.    Procedure:  The patient was brought into the operating room and placed supine on the operating room table.  A brief had been performed already.  General anesthesia was induced and  endotracheal intubation was performed.  The patient was turned 180 degrees.  The area behind the right ear was shaved and marked.  1% lidocaine with 1:100,000 epinephrine was injected into the planned incision as well as the ear canal.  Facial nerve monitor electrodes were placed on the right face and connected to the nerve monitor.  Impedances were checked and a tap test was performed.  The monitor was used for the entirety of the case.  The patient was then prepped and draped in standard surgical fashion.  Time Out was performed with identification of the patient, side of the procedure and procedures to be done.    Underneath the otologic microscope, the canal was cleaned of significant cerumen and 1% lidocaine with 1 100,000 epinephrine was instilled in the vascular strip. The postauricular incision was carried down through the skin and subcutaneous tissue using the prior incision and there was significant scarring that was released.  The level of the temporalis fascia was identified and the mastoid periosteum was incised and the cortex exposed to the ear canal.  A true temporalis fascia graft was harvested from the posterior aspect of the muscle, and set aside to dry for later use.  It did not appear that prior fascia grafts have been harvested, consistent with the operative report noting bio design use previously.     Underneath the otologic microscope the vascular strip was elevated incised and reflected forward with the pinna.  The anterior aspect of the tympanic membrane could not be visualized even with the postauricular approach given the anterior canal wall overhang.  Prior op report indicated that a canalplasty had been performed anteriorly and the previous incision line could be seen.  The skin was incised at the bony cartilaginous junction and elevated off of the anterior canal wall down to the level of the palpable annulus.  CT was consulted.  Using the high-speed otologic drill and torres burs  inferior canalplasty was developed.  The greatest degree of difficulty in visualization is due to a very large inferior canal wall hump which was brought down sufficiently to visualize the inferior annulus and then some additional superior canalplasty was performed, being very careful given that working already been done by the prior surgeon in this region.  An additional thin layer of bone was removed off of the anterior aspect and this afforded visualization of the anterior annular rim.  The skin was elevated and reflected off of this and removed as a free graft and set aside for later replacement with the appropriate orientation.  The posterior aspect of the tympanic membrane was also absent with the exception of some scarred membrane draping the incudostapedial complex as well as an area of medialized bio design graft anterior to the malleus neck these regions were carefully dissected, preserving the chorda tympani nerve and removed and the squamous layer was removed from the lateral aspect of the malleus with great care.  The remnants were inspected and there is no visible squamous element left on the thin annular rim anteriorly and there is no residual annulus posteriorly from the prior surgery to contend with.  The middle ear was copiously irrigated with saline and all elements inspected.  There is mobility of the ossicular chain although it is somewhat stiff.    The fascia graft was prepared.  The eustachian tube was packed with Ciprodex of Gelfoam's.  The fascia graft was placed medial to the malleus and brought in such a manner up with a slit around the neck of the malleus to drape it appropriately.  The graft was reflected back posteriorly and middle ear space filled with Ciprodex of Gelfoam.  The graft was then placed lateral to the annular remnant for a lateral technique tympanoplasty, but its edges tucked medial to the bony annular rim for stability.  The graft was brought up the posterior canal wall  after ensuring that the posterior aspect of the middle ear was sufficiently packed with Ciprodex of Gelfoam.  The anterior canal skin was then brought down the ear canal and placed so that it immediately abutted the annular rim to touch the graft but not to overlap it on the surface of the tympanic membrane.  Dry rolled Gelfoam was packed very tightly in the sulcus and then the graft and the skin graft were secured with additional Ciprodex soaked Gelfoam.  The vascular strip was unfurled and placed in anatomic position, after tacking the periosteum with 3-0 Vicryl sutures.  The ear canal was then filled with Ciprodex of Gelfoam.  The periosteum closure was completed with 3-0 Vicryl sutures, and the postauricular incision was closed in multiple layers with 3-0 Vicryl sutures.  Tissue glue and wound healing mesh were placed lateral to the incision line.  A Diane dressing was then secured.      This procedure qualifies for 62 modification given that it required more than 2 times the typical length of time needed for a typical tympanoplasty given anatomic complexity and need to address prior surgical grafts and attendant challenges.    Mayra Sanders MD  Otology & Neurotology  [facility]

## 2022-12-09 ENCOUNTER — TELEPHONE (OUTPATIENT)
Dept: OTOLARYNGOLOGY | Facility: CLINIC | Age: 22
End: 2022-12-09

## 2022-12-09 NOTE — TELEPHONE ENCOUNTER
[facility] Call Center    Phone Message    May a detailed message be left on voicemail: yes     Reason for Call: Pt's father calling with questions on how to administer medication following surgery with Dr Sanders. Please advise.    Action Taken: Message routed to:  [facility] ([facility] ENT    Travel Screening: Not Applicable

## 2022-12-09 NOTE — TELEPHONE ENCOUNTER
He had a question of how often to do the drops before coming in as they were not on the bottle or on the AVS. No further questions.    Rhonda Zapata LPN

## 2022-12-12 NOTE — PROGRESS NOTES
"  Neurotology Clinic      Name: Saul Wilson  MRN: [id number]  Age: 22 year old  : 2000  2022      Chief Complaint:   Follow up     History of Present Illness:   Saul Wilson is a 22 year old male with a history of traumatic right tympanic membrane perforation, s/p right lateral technique tympanoplasty (revision) with bony canalplasty 22 who presents for follow up. Previously, patient had s/p post-auricular tympanoplasty with Biodesign synthetic graft placement in [place] in 2022 which failed.    Today, he reports that the incision has been healing well. The packing has started to come out, and the plug came out several days after the surgery. He has been using ear drops, but he is not otherwise cleaning the ear.     Review of Systems:   Pertinent items are noted in HPI or as in patient entered ROS below, remainder of complete ROS is negative.    ENT ROS 2022   Constitutional: -   Neurology: -   Ears, Nose, Throat: Ear pain, Nasal congestion or drainage         Physical Exam:   /74   Pulse 79   Temp 98.3  F (36.8  C)   Ht 1.905 m (6' 3\")   Wt 89.4 kg (197 lb)   SpO2 97%   BMI 24.62 kg/m       Constitutional:  The patient was accompanied, well-groomed, and in no acute distress.     Skin: Normal:  warm and pink without rash   Neurologic: Alert and oriented x 3.  CN's III-XII within normal limits.  Voice normal.    Psychiatric: The patient's affect was calm, cooperative, and appropriate.     Communication:  Normal; communicates verbally, normal voice quality.   Respiratory: Breathing comfortably without stridor or exertion of accessory muscles.    Head/Face:  No lesions or scars.   Ears: Pinnae and tragus non-tender.          Otologic microscope exam:  Right ear: Well healing postauricular incision. Packing in EAC removed with alligator, suction, and right angle. Good healing response posteriorly. The fascia graft is not epithelialized and is mobile, and the " gel foam cannot be removed because of that.      Assessment and Plan:  Saul Wilson is a 22 year old male with a history of traumatic right tympanic membrane perforation, s/p right lateral technique tympanoplasty (revision) with bony canalplasty 11/23/22 who presents for follow up. The fascia graft is not epithelialized and the gel foam cannot be removed because of that. I recommended using Ciprodex drops daily for the rest of the week then switching to Floxin drops intermittently, then to keep it dry. I will see him back in the first week of January.     Scribe Disclosure:  I, Abdirashid Bullard, am serving as a scribe to document services personally performed by Mayra Sanders MD at this visit, based upon the provider's statements to me. All documentation has been reviewed by the aforementioned provider prior to being entered into the official medical record.    The documentation recorded by the scribe accurately reflects the services I personally performed and the decisions made by me.    Mayra Sanders MD  Otology & Neurotology  [facility]

## 2022-12-13 ENCOUNTER — OFFICE VISIT (OUTPATIENT)
Dept: OTOLARYNGOLOGY | Facility: CLINIC | Age: 22
End: 2022-12-13
Payer: COMMERCIAL

## 2022-12-13 VITALS
HEIGHT: 75 IN | SYSTOLIC BLOOD PRESSURE: 120 MMHG | OXYGEN SATURATION: 97 % | BODY MASS INDEX: 24.49 KG/M2 | HEART RATE: 79 BPM | TEMPERATURE: 98.3 F | WEIGHT: 197 LBS | DIASTOLIC BLOOD PRESSURE: 74 MMHG

## 2022-12-13 DIAGNOSIS — H72.91 PERFORATION OF RIGHT TYMPANIC MEMBRANE: Primary | ICD-10-CM

## 2022-12-13 PROCEDURE — 99024 POSTOP FOLLOW-UP VISIT: CPT | Performed by: OTOLARYNGOLOGY

## 2022-12-13 RX ORDER — CIPROFLOXACIN AND DEXAMETHASONE 3; 1 MG/ML; MG/ML
4 SUSPENSION/ DROPS AURICULAR (OTIC) 2 TIMES DAILY
COMMUNITY
End: 2023-01-10

## 2022-12-13 RX ORDER — OFLOXACIN 3 MG/ML
SOLUTION AURICULAR (OTIC)
Qty: 10 ML | Refills: 0 | Status: SHIPPED | OUTPATIENT
Start: 2022-12-13 | End: 2023-01-31

## 2022-12-13 ASSESSMENT — PAIN SCALES - GENERAL: PAINLEVEL: NO PAIN (0)

## 2022-12-13 NOTE — PATIENT INSTRUCTIONS
You were seen in the ENT Clinic today by Dr. Sanders. If you have any questions or concerns after your appointment, please contact us (see below)      2.   Please return to clinic on Corky. 3 at 10am.   3. Floxin drops have been sent to your preferred pharmacy, please use as directed.         How to Contact Us:  Send a [employer] message to your provider. Our team will respond to you via [employer]. Occasionally, we will need to call you to get further information.  For urgent matters (Monday-Friday), call the ENT Clinic: [phone] and speak with a call center team member - they will route your call appropriately.   If you'd like to speak directly with a nurse, please find our contact information below. We do our best to check voicemail frequently throughout the day, and will work to call you back within 1-2 days. For urgent matters, please use the general clinic phone numbers listed above.      Mena WILSON RN  ENT RN Care Coordinator  Direct: [phone]    Rhonda FOWLER LPN  Direct: [phone]

## 2022-12-13 NOTE — LETTER
"2022       RE: Saul Wilson  [address]  [place] [address]     Dear Colleague,    Thank you for referring your patient, Saul Wilson, to the [facility] [place] [facility]. Please see a copy of my visit note below.      Neurotology Clinic      Name: Saul Wilson  MRN: [id number]  Age: 22 year old  : 2000  2022      Chief Complaint:   Follow up     History of Present Illness:   Saul Wilson is a 22 year old male with a history of traumatic right tympanic membrane perforation, s/p right lateral technique tympanoplasty (revision) with bony canalplasty 22 who presents for follow up. Previously, patient had s/p post-auricular tympanoplasty with Biodesign synthetic graft placement in [place] in 2022 which failed.    Today, he reports that the incision has been healing well. The packing has started to come out, and the plug came out several days after the surgery. He has been using ear drops, but he is not otherwise cleaning the ear.     Review of Systems:   Pertinent items are noted in HPI or as in patient entered ROS below, remainder of complete ROS is negative.    ENT ROS 2022   Constitutional: -   Neurology: -   Ears, Nose, Throat: Ear pain, Nasal congestion or drainage         Physical Exam:   /74   Pulse 79   Temp 98.3  F (36.8  C)   Ht 1.905 m (6' 3\")   Wt 89.4 kg (197 lb)   SpO2 97%   BMI 24.62 kg/m       Constitutional:  The patient was accompanied, well-groomed, and in no acute distress.     Skin: Normal:  warm and pink without rash   Neurologic: Alert and oriented x 3.  CN's III-XII within normal limits.  Voice normal.    Psychiatric: The patient's affect was calm, cooperative, and appropriate.     Communication:  Normal; communicates verbally, normal voice quality.   Respiratory: Breathing comfortably without stridor or exertion of accessory " muscles.    Head/Face:  No lesions or scars.   Ears: Pinnae and tragus non-tender.          Otologic microscope exam:  Right ear: Well healing postauricular incision. Packing in EAC removed with alligator, suction, and right angle. Good healing response posteriorly. The fascia graft is not epithelialized and is mobile, and the gel foam cannot be removed because of that.      Assessment and Plan:  Saul Wilson is a 22 year old male with a history of traumatic right tympanic membrane perforation, s/p right lateral technique tympanoplasty (revision) with bony canalplasty 11/23/22 who presents for follow up. The fascia graft is not epithelialized and the gel foam cannot be removed because of that. I recommended using Ciprodex drops daily for the rest of the week then switching to Floxin drops intermittently, then to keep it dry. I will see him back in the first week of January.     Scribe Disclosure:  I, Abdirashid Bullard, am serving as a scribe to document services personally performed by Mayra Sanders MD at this visit, based upon the provider's statements to me. All documentation has been reviewed by the aforementioned provider prior to being entered into the official medical record.    The documentation recorded by the scribe accurately reflects the services I personally performed and the decisions made by me.    Mayra Sanders MD  Otology & Neurotology  [facility]

## 2023-01-10 ENCOUNTER — OFFICE VISIT (OUTPATIENT)
Dept: OTOLARYNGOLOGY | Facility: CLINIC | Age: 23
End: 2023-01-10
Payer: COMMERCIAL

## 2023-01-10 VITALS
WEIGHT: 203 LBS | HEIGHT: 75 IN | TEMPERATURE: 98.1 F | DIASTOLIC BLOOD PRESSURE: 83 MMHG | OXYGEN SATURATION: 95 % | HEART RATE: 67 BPM | BODY MASS INDEX: 25.24 KG/M2 | SYSTOLIC BLOOD PRESSURE: 129 MMHG

## 2023-01-10 DIAGNOSIS — H72.91 PERFORATION OF RIGHT TYMPANIC MEMBRANE: Primary | ICD-10-CM

## 2023-01-10 PROCEDURE — 99024 POSTOP FOLLOW-UP VISIT: CPT | Performed by: OTOLARYNGOLOGY

## 2023-01-10 RX ORDER — CIPROFLOXACIN AND DEXAMETHASONE 3; 1 MG/ML; MG/ML
4 SUSPENSION/ DROPS AURICULAR (OTIC) 2 TIMES DAILY
Qty: 7.5 ML | Refills: 1 | Status: SHIPPED | OUTPATIENT
Start: 2023-01-10 | End: 2023-05-31

## 2023-01-10 ASSESSMENT — PAIN SCALES - GENERAL: PAINLEVEL: NO PAIN (0)

## 2023-01-10 NOTE — LETTER
"1/10/2023       RE: Saul Wilson  [address]  [facility] [address]     Dear Colleague,    Thank you for referring your patient, Saul Wilson, to the [facility] EAR NOSE AND THROAT [facility]INIC [place] at [facility]. Please see a copy of my visit note below.      Neurotology Clinic      Name: Saul Wilson  MRN: [id number]  Age: 22 year old  : 2000  01/10/2023      Chief Complaint:   Follow up     History of Present Illness:   Saul Wilson is a 22 year old male with a history of traumatic right tympanic membrane perforation, s/p right lateral technique tympanoplasty (revision) with bony canalplasty 22 who presents for follow up. Previously, patient had s/p post-auricular tympanoplasty with Biodesign synthetic graft placement in [place] in 2022 which failed. At our last visit on 22, he reported that he was healing well.     Today, he reports that he is doing well. His hearing has improved a bit. Additionally, he endorses some draining, but this had decreased in quantity. He has been using ear drops once a day. Patient does note some dizziness, and he feels that his balance is slightly. It does improve within 5 minutes. It is most noticeable when he does squats.     Review of Systems:   Pertinent items are noted in HPI or as in patient entered ROS below, remainder of complete ROS is negative.    ENT ROS 1/10/2023   Constitutional: -   Neurology: Dizzy spells   Ears, Nose, Throat: Ringing/noise in ears, Nasal congestion or drainage         Physical Exam:   /83   Pulse 67   Temp 98.1  F (36.7  C)   Ht 1.905 m (6' 3\")   Wt 92.1 kg (203 lb)   SpO2 95%   BMI 25.37 kg/m       Constitutional:  The patient was accompanied, well-groomed, and in no acute distress.     Skin: Normal:  warm and pink without rash   Neurologic: Alert and oriented x 3.  CN's III-XII within normal limits.  Voice normal.  "   Psychiatric: The patient's affect was calm, cooperative, and appropriate.     Communication:  Normal; communicates verbally, normal voice quality.   Respiratory: Breathing comfortably without stridor or exertion of accessory muscles.    Eyes: Pupils were equal and reactive.  Extraocular movement intact.     Ears: Pinnae and tragus non-tender.  EAC's and TM's were clear.        Otologic microscope exam:  Right ear: Crusted, sloughing skin and inflammatory tissue suctioned from canal.  The posterior tympanic membrane can be seen and graft is epithelializing.  Remains difficult to see anterior aspect.     Assessment and Plan:  Saul Wilson is a 22 year old male with a history of traumatic right tympanic membrane perforation, s/p right lateral technique tympanoplasty (revision) with bony canalplasty 11/23/22 who presents for follow up. On exam, we can see an intact posterior tympanic membrane but there is ongoing inflammation, so ear drops were placed after suctioning. He will continue to use the remaining Ciprodex at home, and I will prescribe him more. He will follow up in 2-3 weeks.     Scribe Disclosure:  I, Love Singh, am serving as a scribe to document services personally performed by Mayra Sanders MD at this visit, based upon the provider's statements to me. All documentation has been reviewed by the aforementioned provider prior to being entered into the official medical record.      The documentation recorded by the scribe accurately reflects the services I personally performed and the decisions made by me.    Mayra Sanders MD  Otology & Neurotology  [facility]

## 2023-01-10 NOTE — PATIENT INSTRUCTIONS
1. You were seen in the ENT Clinic today by Dr. Sanders.  If you have any questions or concerns after your appointment, please call   - Option 1: ENT Clinic: [phone]   - Option 2: Rhonda (Dr. Sanders' Nurse): [phone]                    Mena (Dr. Sanders' Nurse): [phone]      2.   Plan to return to clinic in 2-3 weeks with hearing test    3. Reordered Ciprodex drops    How to Contact Us:  Send a iMedicare message to your provider. Our team will respond to you via iMedicare. Occasionally, we will need to call you to get further information.  For urgent matters (Monday-Friday), call the ENT Clinic: [phone] and speak with a call center team member - they will route your call appropriately.   If you'd like to speak directly with a nurse, please find our contact information below. We do our best to check voicemail frequently throughout the day, and will work to call you back within 1-2 days. For urgent matters, please use the general clinic phone numbers listed above.       Rhonda MONTEMAYOR LPN  [facility] - Otolaryngology

## 2023-01-10 NOTE — PROGRESS NOTES
"  Neurotology Clinic      Name: Saul Wilson  MRN: [id number]  Age: 22 year old  : 2000  01/10/2023      Chief Complaint:   Follow up     History of Present Illness:   Saul Wilson is a 22 year old male with a history of traumatic right tympanic membrane perforation, s/p right lateral technique tympanoplasty (revision) with bony canalplasty 22 who presents for follow up. Previously, patient had s/p post-auricular tympanoplasty with Biodesign synthetic graft placement in [place] in 2022 which failed. At our last visit on 22, he reported that he was healing well.     Today, he reports that he is doing well. His hearing has improved a bit. Additionally, he endorses some draining, but this had decreased in quantity. He has been using ear drops once a day. Patient does note some dizziness, and he feels that his balance is slightly. It does improve within 5 minutes. It is most noticeable when he does squats.     Review of Systems:   Pertinent items are noted in HPI or as in patient entered ROS below, remainder of complete ROS is negative.    ENT ROS 1/10/2023   Constitutional: -   Neurology: Dizzy spells   Ears, Nose, Throat: Ringing/noise in ears, Nasal congestion or drainage         Physical Exam:   /83   Pulse 67   Temp 98.1  F (36.7  C)   Ht 1.905 m (6' 3\")   Wt 92.1 kg (203 lb)   SpO2 95%   BMI 25.37 kg/m       Constitutional:  The patient was accompanied, well-groomed, and in no acute distress.     Skin: Normal:  warm and pink without rash   Neurologic: Alert and oriented x 3.  CN's III-XII within normal limits.  Voice normal.    Psychiatric: The patient's affect was calm, cooperative, and appropriate.     Communication:  Normal; communicates verbally, normal voice quality.   Respiratory: Breathing comfortably without stridor or exertion of accessory muscles.    Eyes: Pupils were equal and reactive.  Extraocular movement intact.     Ears: Pinnae and tragus " non-tender.  EAC's and TM's were clear.        Otologic microscope exam:  Right ear: Crusted, sloughing skin and inflammatory tissue suctioned from canal.  The posterior tympanic membrane can be seen and graft is epithelializing.  Remains difficult to see anterior aspect.     Assessment and Plan:  Saul Wilson is a 22 year old male with a history of traumatic right tympanic membrane perforation, s/p right lateral technique tympanoplasty (revision) with bony canalplasty 11/23/22 who presents for follow up. On exam, we can see an intact posterior tympanic membrane but there is ongoing inflammation, so ear drops were placed after suctioning. He will continue to use the remaining Ciprodex at home, and I will prescribe him more. He will follow up in 2-3 weeks.     Scribe Disclosure:  I, Love Singh, am serving as a scribe to document services personally performed by Mayra Sanders MD at this visit, based upon the provider's statements to me. All documentation has been reviewed by the aforementioned provider prior to being entered into the official medical record.      The documentation recorded by the scribe accurately reflects the services I personally performed and the decisions made by me.    Mayra Sanders MD  Otology & Neurotology  [facility]

## 2023-01-10 NOTE — NURSING NOTE
"Chief Complaint   Patient presents with     RECHECK     6 week follow up     Blood pressure 129/83, pulse 67, temperature 98.1  F (36.7  C), height 1.905 m (6' 3\"), weight 92.1 kg (203 lb), SpO2 95 %.    Jaswant Reyes LPN      "

## 2023-01-26 DIAGNOSIS — H90.11 CONDUCTIVE HEARING LOSS OF RIGHT EAR WITH UNRESTRICTED HEARING OF LEFT EAR: Primary | ICD-10-CM

## 2023-01-30 NOTE — PROGRESS NOTES
Neurotology Clinic      Name: Saul Wilson  MRN: [id number]  Age: 22 year old  : 2000  2023      Chief Complaint:   Follow up     History of Present Illness:   Saul Wilson is a 22 year old male with a history of history of traumatic right tympanic membrane perforation. Previously, patient had s/p post-auricular tympanoplasty with Biodesign synthetic graft placement in [place] in 2022 which failed leaving a large marginal tympanic membrane perforation with no anterior annular remnant. He is now s/p rright lateral technique tympanoplasty (revision) with bony canalplasty 22 who presents for follow up.He was last seen on 1/10/23 with continued treatment course of Ciprodex recommended.     He has felt continued diminished hearing but is able to hear from the right. He has been using Ciprodex. There has also been minimal drainage.     He has since transferred to the [employer] [facility] for school.     Review of Systems:   Pertinent items are noted in HPI or as in patient entered ROS below, remainder of complete ROS is negative.    ENT ROS 2023   Constitutional: Unexplained fatigue   Neurology: Dizzy spells   Ears, Nose, Throat: Nasal congestion or drainage         Physical Exam:   There were no vitals taken for this visit.     Constitutional:  The patient was unaccompanied, well-groomed, and in no acute distress.     Skin: Normal:  warm and pink without rash   Neurologic: Alert and oriented x 3.  CN's III-XII within normal limits.  Voice normal.    Psychiatric: The patient's affect was calm, cooperative, and appropriate.     Communication:  Normal; communicates verbally, normal voice quality.   Respiratory: Breathing comfortably without stridor or exertion of accessory muscles.    Eyes: Pupils were equal and reactive.  Extraocular movement intact.     Ears: Pinnae and tragus non-tender.      Otologic microscope exam:  Right ear: Canal is lined with epithelium.  Anterior aspect of TM is blunted. There is complete closure and engraftment.  Lack of malleus landmark.    Audiogram:  AUDIOGRAM: He underwent an audiogram today. This demonstrated:      Right: Speech reception threshold is 30 dB with 100% word recognition.   Left: Speech reception threshold is 10 dB with 100% word recognition. Tympanogram A type     Audiogram was independently reviewed       Assessment and Plan:  Saul Wilson is a 22 year old male with history of trauamtic right TM perforation s/p right lateral technique tympanoplasty (revision) with bony canalplasty 11/23/22 who presents for follow up. On exam today there is now complete engraftment and closure of the TM.  I discussed that the anterior blunting of his TM is likely leading to the continued mild hearing loss and that we will need further time of healing to assess his hearing recuperation. I discussed that he can discontinue dry ear precautions and cease Ciprodex. We will have him return in 6 months with repeat audiogram.         Scribe Disclosure:  I, Klever Dougherty, am serving as a scribe to document services personally performed by Mayra Sanders MD at this visit, based upon the provider's statements to me. All documentation has been reviewed by the aforementioned provider prior to being entered into the official medical record.      The documentation recorded by the scribe accurately reflects the services I personally performed and the decisions made by me.    Mayra Sanders MD  Otology & Neurotology  [facility]

## 2023-01-31 ENCOUNTER — OFFICE VISIT (OUTPATIENT)
Dept: OTOLARYNGOLOGY | Facility: CLINIC | Age: 23
End: 2023-01-31
Payer: COMMERCIAL

## 2023-01-31 ENCOUNTER — OFFICE VISIT (OUTPATIENT)
Dept: AUDIOLOGY | Facility: CLINIC | Age: 23
End: 2023-01-31
Payer: COMMERCIAL

## 2023-01-31 DIAGNOSIS — H90.11 CONDUCTIVE HEARING LOSS OF RIGHT EAR WITH UNRESTRICTED HEARING OF LEFT EAR: Primary | ICD-10-CM

## 2023-01-31 PROCEDURE — 92550 TYMPANOMETRY & REFLEX THRESH: CPT | Mod: 52 | Performed by: AUDIOLOGIST

## 2023-01-31 PROCEDURE — 99024 POSTOP FOLLOW-UP VISIT: CPT | Performed by: OTOLARYNGOLOGY

## 2023-01-31 PROCEDURE — 92557 COMPREHENSIVE HEARING TEST: CPT | Performed by: AUDIOLOGIST

## 2023-01-31 ASSESSMENT — PAIN SCALES - GENERAL: PAINLEVEL: NO PAIN (0)

## 2023-01-31 NOTE — PROGRESS NOTES
AUDIOLOGY REPORT    SUMMARY: Audiology visit completed. See audiogram for results.      RECOMMENDATIONS: Follow-up with ENT.    Renetta Rawls, [facility]  Licensed Audiologist  [place] #[id number]

## 2023-01-31 NOTE — LETTER
2023       RE: Saul Wilson  [address]  [facility] [address]     Dear Colleague,    Thank you for referring your patient, Saul Wilson, to the [facility] EAR NOSE AND THROAT CLINIC [place] [facility]. Please see a copy of my visit note below.      Neurotology Clinic      Name: Saul Wilson  MRN: [id number]  Age: 22 year old  : 2000  2023      Chief Complaint:   Follow up     History of Present Illness:   Saul Wilson is a 22 year old male with a history of history of traumatic right tympanic membrane perforation. Previously, patient had s/p post-auricular tympanoplasty with Biodesign synthetic graft placement in [place] in 2022 which failed leaving a large marginal tympanic membrane perforation with no anterior annular remnant. He is now s/p rright lateral technique tympanoplasty (revision) with bony canalplasty 22 who presents for follow up.He was last seen on 1/10/23 with continued treatment course of Ciprodex recommended.     He has felt continued diminished hearing but is able to hear from the right. He has been using Ciprodex. There has also been minimal drainage.     He has since transferred to the [employer] for school.     Review of Systems:   Pertinent items are noted in HPI or as in patient entered ROS below, remainder of complete ROS is negative.    ENT ROS 2023   Constitutional: Unexplained fatigue   Neurology: Dizzy spells   Ears, Nose, Throat: Nasal congestion or drainage         Physical Exam:   There were no vitals taken for this visit.     Constitutional:  The patient was unaccompanied, well-groomed, and in no acute distress.     Skin: Normal:  warm and pink without rash   Neurologic: Alert and oriented x 3.  CN's III-XII within normal limits.  Voice normal.    Psychiatric: The patient's affect was calm, cooperative, and appropriate.     Communication:   Normal; communicates verbally, normal voice quality.   Respiratory: Breathing comfortably without stridor or exertion of accessory muscles.    Eyes: Pupils were equal and reactive.  Extraocular movement intact.     Ears: Pinnae and tragus non-tender.      Otologic microscope exam:  Right ear: Canal is lined with epithelium. Anterior aspect of TM is blunted. There is complete closure and engraftment.  Lack of malleus landmark.    Audiogram:  AUDIOGRAM: He underwent an audiogram today. This demonstrated:      Right: Speech reception threshold is 30 dB with 100% word recognition.   Left: Speech reception threshold is 10 dB with 100% word recognition. Tympanogram A type     Audiogram was independently reviewed       Assessment and Plan:  Saul Wilson is a 22 year old male with history of trauamtic right TM perforation s/p right lateral technique tympanoplasty (revision) with bony canalplasty 11/23/22 who presents for follow up. On exam today there is now complete engraftment and closure of the TM.  I discussed that the anterior blunting of his TM is likely leading to the continued mild hearing loss and that we will need further time of healing to assess his hearing recuperation. I discussed that he can discontinue dry ear precautions and cease Ciprodex. We will have him return in 6 months with repeat audiogram.         Scribe Disclosure:  I, Klever Dougherty, am serving as a scribe to document services personally performed by Mayra Sanders MD at this visit, based upon the provider's statements to me. All documentation has been reviewed by the aforementioned provider prior to being entered into the official medical record.      The documentation recorded by the scribe accurately reflects the services I personally performed and the decisions made by me.    Mayra Sanders MD  Otology & Neurotology  [facility]

## 2023-01-31 NOTE — PATIENT INSTRUCTIONS
You were seen in the ENT Clinic today by Dr. Sanders. If you have any questions or concerns after your appointment, please contact us (see below)      2.   Please return to clinic in 6 months with an audiogram         How to Contact Us:  Send a Snagsta message to your provider. Our team will respond to you via Snagsta. Occasionally, we will need to call you to get further information.  For urgent matters (Monday-Friday), call the ENT Clinic: [phone] and speak with a call center team member - they will route your call appropriately.   If you'd like to speak directly with a nurse, please find our contact information below. We do our best to check voicemail frequently throughout the day, and will work to call you back within 1-2 days. For urgent matters, please use the general clinic phone numbers listed above.      Mena WILSON RN  ENT RN Care Coordinator  Direct: [phone]    Rhonda FOWLER LPN  Direct: [phone]

## 2023-05-31 NOTE — PROGRESS NOTES
PTA medications updated by Medication Scribe prior to surgery via phone call with patient (last doses completed by Nurse)     Medication history sources: Patient, Surescripts and H&P  In the past week, patient estimated taking medication this percent of the time: Pt confirms no meds (RX, OTC, PRN)      Significant changes made to the medication list:  None      Additional medication history information:   None    Medication reconciliation completed by provider prior to medication history? No    Time spent in this activity: 20 minutes    The information provided in this note is only as accurate as the sources available at the time of update(s)      Prior to Admission medications    Not on File

## 2023-06-01 ENCOUNTER — HOSPITAL ENCOUNTER (INPATIENT)
Facility: CLINIC | Age: 23
LOS: 2 days | Discharge: HOME | End: 2023-06-03
Attending: DENTIST | Admitting: DENTIST
Payer: COMMERCIAL

## 2023-06-01 ENCOUNTER — ANESTHESIA EVENT (OUTPATIENT)
Dept: SURGERY | Facility: CLINIC | Age: 23
End: 2023-06-01
Payer: COMMERCIAL

## 2023-06-01 ENCOUNTER — ANESTHESIA (OUTPATIENT)
Dept: SURGERY | Facility: CLINIC | Age: 23
End: 2023-06-01
Payer: COMMERCIAL

## 2023-06-01 PROBLEM — M26.02 MAXILLARY HYPOPLASIA: Status: ACTIVE | Noted: 2023-06-01

## 2023-06-01 LAB
CREAT SERPL-MCNC: 0.75 MG/DL (ref 0.67–1.17)
GFR SERPL CREATININE-BSD FRML MDRD: >90 ML/MIN/1.73M2

## 2023-06-01 PROCEDURE — 0NST04Z REPOSITION RIGHT MANDIBLE WITH INTERNAL FIXATION DEVICE, OPEN APPROACH: ICD-10-PCS | Performed by: DENTIST

## 2023-06-01 PROCEDURE — 258N000003 HC RX IP 258 OP 636: Performed by: ANESTHESIOLOGY

## 2023-06-01 PROCEDURE — 272N000001 HC OR GENERAL SUPPLY STERILE: Performed by: DENTIST

## 2023-06-01 PROCEDURE — 250N000013 HC RX MED GY IP 250 OP 250 PS 637: Performed by: DENTIST

## 2023-06-01 PROCEDURE — 258N000003 HC RX IP 258 OP 636: Performed by: DENTIST

## 2023-06-01 PROCEDURE — 999N000141 HC STATISTIC PRE-PROCEDURE NURSING ASSESSMENT: Performed by: DENTIST

## 2023-06-01 PROCEDURE — 250N000011 HC RX IP 250 OP 636

## 2023-06-01 PROCEDURE — 36415 COLL VENOUS BLD VENIPUNCTURE: CPT | Performed by: DENTIST

## 2023-06-01 PROCEDURE — 0NSR04Z REPOSITION MAXILLA WITH INTERNAL FIXATION DEVICE, OPEN APPROACH: ICD-10-PCS | Performed by: DENTIST

## 2023-06-01 PROCEDURE — 82565 ASSAY OF CREATININE: CPT | Performed by: DENTIST

## 2023-06-01 PROCEDURE — 360N000077 HC SURGERY LEVEL 4, PER MIN: Performed by: DENTIST

## 2023-06-01 PROCEDURE — 250N000011 HC RX IP 250 OP 636: Performed by: ANESTHESIOLOGY

## 2023-06-01 PROCEDURE — 250N000009 HC RX 250: Performed by: NURSE ANESTHETIST, CERTIFIED REGISTERED

## 2023-06-01 PROCEDURE — 250N000011 HC RX IP 250 OP 636: Performed by: DENTIST

## 2023-06-01 PROCEDURE — 710N000009 HC RECOVERY PHASE 1, LEVEL 1, PER MIN: Performed by: DENTIST

## 2023-06-01 PROCEDURE — 250N000025 HC SEVOFLURANE, PER MIN: Performed by: DENTIST

## 2023-06-01 PROCEDURE — 370N000017 HC ANESTHESIA TECHNICAL FEE, PER MIN: Performed by: DENTIST

## 2023-06-01 PROCEDURE — 120N000001 HC R&B MED SURG/OB

## 2023-06-01 PROCEDURE — C1713 ANCHOR/SCREW BN/BN,TIS/BN: HCPCS | Performed by: DENTIST

## 2023-06-01 PROCEDURE — 250N000011 HC RX IP 250 OP 636: Performed by: NURSE ANESTHETIST, CERTIFIED REGISTERED

## 2023-06-01 PROCEDURE — 250N000009 HC RX 250: Performed by: DENTIST

## 2023-06-01 PROCEDURE — 0NSV04Z REPOSITION LEFT MANDIBLE WITH INTERNAL FIXATION DEVICE, OPEN APPROACH: ICD-10-PCS | Performed by: DENTIST

## 2023-06-01 PROCEDURE — 258N000003 HC RX IP 258 OP 636: Performed by: NURSE ANESTHETIST, CERTIFIED REGISTERED

## 2023-06-01 PROCEDURE — 250N000009 HC RX 250: Performed by: ANESTHESIOLOGY

## 2023-06-01 DEVICE — IMP SCR STRK 2.0X04MM 5020404: Type: IMPLANTABLE DEVICE | Site: MANDIBLE | Status: FUNCTIONAL

## 2023-06-01 DEVICE — IMP PLATE STRK ORBITAL 08H 9255758: Type: IMPLANTABLE DEVICE | Site: MAXILLA | Status: FUNCTIONAL

## 2023-06-01 DEVICE — WIRE SURGICAL STEEL 24GA 2 DS-24: Type: IMPLANTABLE DEVICE | Site: MOUTH | Status: FUNCTIONAL

## 2023-06-01 DEVICE — IMPLANTABLE DEVICE: Type: IMPLANTABLE DEVICE | Site: MAXILLA | Status: FUNCTIONAL

## 2023-06-01 DEVICE — IMP PLATE LEIB SAG 4 HOLE 3MM 92-10565: Type: IMPLANTABLE DEVICE | Site: MANDIBLE | Status: FUNCTIONAL

## 2023-06-01 DEVICE — IMP SCR STRK 2.0X10MM 5020410: Type: IMPLANTABLE DEVICE | Site: MANDIBLE | Status: FUNCTIONAL

## 2023-06-01 DEVICE — IMP SCR STRK CROSS 1.7X5MM SELF TAP 5017005: Type: IMPLANTABLE DEVICE | Site: MAXILLA | Status: FUNCTIONAL

## 2023-06-01 RX ORDER — METHYLPREDNISOLONE SODIUM SUCCINATE 125 MG/2ML
125 INJECTION, POWDER, LYOPHILIZED, FOR SOLUTION INTRAMUSCULAR; INTRAVENOUS EVERY 6 HOURS
Status: COMPLETED | OUTPATIENT
Start: 2023-06-02 | End: 2023-06-02

## 2023-06-01 RX ORDER — KETOROLAC TROMETHAMINE 15 MG/ML
15 INJECTION, SOLUTION INTRAMUSCULAR; INTRAVENOUS EVERY 6 HOURS
Status: COMPLETED | OUTPATIENT
Start: 2023-06-01 | End: 2023-06-02

## 2023-06-01 RX ORDER — MAGNESIUM HYDROXIDE 1200 MG/15ML
LIQUID ORAL PRN
Status: DISCONTINUED | OUTPATIENT
Start: 2023-06-01 | End: 2023-06-01 | Stop reason: HOSPADM

## 2023-06-01 RX ORDER — ONDANSETRON 4 MG/1
4 TABLET, ORALLY DISINTEGRATING ORAL EVERY 30 MIN PRN
Status: DISCONTINUED | OUTPATIENT
Start: 2023-06-01 | End: 2023-06-01 | Stop reason: HOSPADM

## 2023-06-01 RX ORDER — SODIUM CHLORIDE, SODIUM LACTATE, POTASSIUM CHLORIDE, CALCIUM CHLORIDE 600; 310; 30; 20 MG/100ML; MG/100ML; MG/100ML; MG/100ML
INJECTION, SOLUTION INTRAVENOUS CONTINUOUS
Status: DISCONTINUED | OUTPATIENT
Start: 2023-06-01 | End: 2023-06-01 | Stop reason: HOSPADM

## 2023-06-01 RX ORDER — OXYMETAZOLINE HYDROCHLORIDE 0.05 G/100ML
2 SPRAY NASAL 2 TIMES DAILY PRN
Status: DISCONTINUED | OUTPATIENT
Start: 2023-06-01 | End: 2023-06-03 | Stop reason: HOSPADM

## 2023-06-01 RX ORDER — ONDANSETRON 2 MG/ML
INJECTION INTRAMUSCULAR; INTRAVENOUS PRN
Status: DISCONTINUED | OUTPATIENT
Start: 2023-06-01 | End: 2023-06-01

## 2023-06-01 RX ORDER — ACETAMINOPHEN 325 MG/1
975 TABLET ORAL EVERY 8 HOURS
Status: DISCONTINUED | OUTPATIENT
Start: 2023-06-01 | End: 2023-06-03 | Stop reason: HOSPADM

## 2023-06-01 RX ORDER — HYDROMORPHONE HYDROCHLORIDE 1 MG/ML
INJECTION, SOLUTION INTRAMUSCULAR; INTRAVENOUS; SUBCUTANEOUS PRN
Status: DISCONTINUED | OUTPATIENT
Start: 2023-06-01 | End: 2023-06-01

## 2023-06-01 RX ORDER — PROPOFOL 10 MG/ML
INJECTION, EMULSION INTRAVENOUS PRN
Status: DISCONTINUED | OUTPATIENT
Start: 2023-06-01 | End: 2023-06-01

## 2023-06-01 RX ORDER — MORPHINE SULFATE 2 MG/ML
1 INJECTION, SOLUTION INTRAMUSCULAR; INTRAVENOUS
Status: DISCONTINUED | OUTPATIENT
Start: 2023-06-01 | End: 2023-06-03 | Stop reason: HOSPADM

## 2023-06-01 RX ORDER — OXYCODONE HYDROCHLORIDE 5 MG/1
10 TABLET ORAL EVERY 4 HOURS PRN
Status: DISCONTINUED | OUTPATIENT
Start: 2023-06-01 | End: 2023-06-03 | Stop reason: HOSPADM

## 2023-06-01 RX ORDER — CEFAZOLIN SODIUM 2 G/100ML
2 INJECTION, SOLUTION INTRAVENOUS EVERY 8 HOURS
Status: DISCONTINUED | OUTPATIENT
Start: 2023-06-01 | End: 2023-06-03 | Stop reason: HOSPADM

## 2023-06-01 RX ORDER — CEFAZOLIN SODIUM/WATER 2 G/20 ML
2 SYRINGE (ML) INTRAVENOUS SEE ADMIN INSTRUCTIONS
Status: DISCONTINUED | OUTPATIENT
Start: 2023-06-01 | End: 2023-06-01 | Stop reason: HOSPADM

## 2023-06-01 RX ORDER — CEFAZOLIN SODIUM/WATER 2 G/20 ML
2 SYRINGE (ML) INTRAVENOUS
Status: COMPLETED | OUTPATIENT
Start: 2023-06-01 | End: 2023-06-01

## 2023-06-01 RX ORDER — FENTANYL CITRATE 50 UG/ML
50 INJECTION, SOLUTION INTRAMUSCULAR; INTRAVENOUS
Status: DISCONTINUED | OUTPATIENT
Start: 2023-06-01 | End: 2023-06-01 | Stop reason: HOSPADM

## 2023-06-01 RX ORDER — ONDANSETRON 4 MG/1
4 TABLET, ORALLY DISINTEGRATING ORAL EVERY 6 HOURS PRN
Status: DISCONTINUED | OUTPATIENT
Start: 2023-06-01 | End: 2023-06-03 | Stop reason: HOSPADM

## 2023-06-01 RX ORDER — METHYLPREDNISOLONE SODIUM SUCCINATE 125 MG/2ML
125 INJECTION, POWDER, LYOPHILIZED, FOR SOLUTION INTRAMUSCULAR; INTRAVENOUS EVERY 4 HOURS
Status: COMPLETED | OUTPATIENT
Start: 2023-06-01 | End: 2023-06-02

## 2023-06-01 RX ORDER — FENTANYL CITRATE 50 UG/ML
INJECTION, SOLUTION INTRAMUSCULAR; INTRAVENOUS PRN
Status: DISCONTINUED | OUTPATIENT
Start: 2023-06-01 | End: 2023-06-01

## 2023-06-01 RX ORDER — CHLORHEXIDINE GLUCONATE ORAL RINSE 1.2 MG/ML
SOLUTION DENTAL PRN
Status: DISCONTINUED | OUTPATIENT
Start: 2023-06-01 | End: 2023-06-01 | Stop reason: HOSPADM

## 2023-06-01 RX ORDER — CHLORHEXIDINE GLUCONATE ORAL RINSE 1.2 MG/ML
10 SOLUTION DENTAL ONCE
Status: COMPLETED | OUTPATIENT
Start: 2023-06-01 | End: 2023-06-01

## 2023-06-01 RX ORDER — FENTANYL CITRATE 50 UG/ML
50 INJECTION, SOLUTION INTRAMUSCULAR; INTRAVENOUS EVERY 5 MIN PRN
Status: DISCONTINUED | OUTPATIENT
Start: 2023-06-01 | End: 2023-06-01 | Stop reason: HOSPADM

## 2023-06-01 RX ORDER — OXYCODONE HYDROCHLORIDE 5 MG/1
5 TABLET ORAL EVERY 4 HOURS PRN
Status: DISCONTINUED | OUTPATIENT
Start: 2023-06-01 | End: 2023-06-03 | Stop reason: HOSPADM

## 2023-06-01 RX ORDER — BENZOCAINE/MENTHOL 6 MG-10 MG
LOZENGE MUCOUS MEMBRANE EVERY 4 HOURS PRN
Status: DISCONTINUED | OUTPATIENT
Start: 2023-06-01 | End: 2023-06-03 | Stop reason: HOSPADM

## 2023-06-01 RX ORDER — LIDOCAINE 40 MG/G
CREAM TOPICAL
Status: DISCONTINUED | OUTPATIENT
Start: 2023-06-01 | End: 2023-06-03 | Stop reason: HOSPADM

## 2023-06-01 RX ORDER — HYDROMORPHONE HCL IN WATER/PF 6 MG/30 ML
0.2 PATIENT CONTROLLED ANALGESIA SYRINGE INTRAVENOUS EVERY 5 MIN PRN
Status: DISCONTINUED | OUTPATIENT
Start: 2023-06-01 | End: 2023-06-01 | Stop reason: HOSPADM

## 2023-06-01 RX ORDER — HYDROMORPHONE HCL IN WATER/PF 6 MG/30 ML
0.4 PATIENT CONTROLLED ANALGESIA SYRINGE INTRAVENOUS EVERY 5 MIN PRN
Status: DISCONTINUED | OUTPATIENT
Start: 2023-06-01 | End: 2023-06-01 | Stop reason: HOSPADM

## 2023-06-01 RX ORDER — LIDOCAINE HYDROCHLORIDE 20 MG/ML
INJECTION, SOLUTION INFILTRATION; PERINEURAL PRN
Status: DISCONTINUED | OUTPATIENT
Start: 2023-06-01 | End: 2023-06-01

## 2023-06-01 RX ORDER — BISACODYL 10 MG
10 SUPPOSITORY, RECTAL RECTAL DAILY PRN
Status: DISCONTINUED | OUTPATIENT
Start: 2023-06-01 | End: 2023-06-03 | Stop reason: HOSPADM

## 2023-06-01 RX ORDER — BENZOCAINE/MENTHOL 6 MG-10 MG
LOZENGE MUCOUS MEMBRANE PRN
Status: DISCONTINUED | OUTPATIENT
Start: 2023-06-01 | End: 2023-06-01 | Stop reason: HOSPADM

## 2023-06-01 RX ORDER — LABETALOL HYDROCHLORIDE 5 MG/ML
10 INJECTION, SOLUTION INTRAVENOUS EVERY 10 MIN PRN
Status: DISCONTINUED | OUTPATIENT
Start: 2023-06-01 | End: 2023-06-01 | Stop reason: HOSPADM

## 2023-06-01 RX ORDER — AMOXICILLIN 250 MG
1 CAPSULE ORAL 2 TIMES DAILY
Status: DISCONTINUED | OUTPATIENT
Start: 2023-06-01 | End: 2023-06-03 | Stop reason: HOSPADM

## 2023-06-01 RX ORDER — CHLORHEXIDINE GLUCONATE ORAL RINSE 1.2 MG/ML
15 SOLUTION DENTAL 2 TIMES DAILY
Status: DISCONTINUED | OUTPATIENT
Start: 2023-06-01 | End: 2023-06-03 | Stop reason: HOSPADM

## 2023-06-01 RX ORDER — ACETAMINOPHEN 325 MG/1
650 TABLET ORAL EVERY 4 HOURS PRN
Status: DISCONTINUED | OUTPATIENT
Start: 2023-06-04 | End: 2023-06-03 | Stop reason: HOSPADM

## 2023-06-01 RX ORDER — PROCHLORPERAZINE MALEATE 10 MG
10 TABLET ORAL EVERY 6 HOURS PRN
Status: DISCONTINUED | OUTPATIENT
Start: 2023-06-01 | End: 2023-06-03 | Stop reason: HOSPADM

## 2023-06-01 RX ORDER — PSEUDOEPHEDRINE HCL 60 MG
60 TABLET ORAL EVERY 6 HOURS PRN
Status: DISCONTINUED | OUTPATIENT
Start: 2023-06-01 | End: 2023-06-03 | Stop reason: HOSPADM

## 2023-06-01 RX ORDER — NALOXONE HYDROCHLORIDE 0.4 MG/ML
0.2 INJECTION, SOLUTION INTRAMUSCULAR; INTRAVENOUS; SUBCUTANEOUS
Status: DISCONTINUED | OUTPATIENT
Start: 2023-06-01 | End: 2023-06-03 | Stop reason: HOSPADM

## 2023-06-01 RX ORDER — LIDOCAINE HCL/EPINEPHRINE/PF 2%-1:200K
VIAL (ML) INJECTION
Status: DISCONTINUED
Start: 2023-06-01 | End: 2023-06-01 | Stop reason: HOSPADM

## 2023-06-01 RX ORDER — DEXTROSE MONOHYDRATE, SODIUM CHLORIDE, AND POTASSIUM CHLORIDE 50; 1.49; 4.5 G/1000ML; G/1000ML; G/1000ML
INJECTION, SOLUTION INTRAVENOUS CONTINUOUS
Status: DISCONTINUED | OUTPATIENT
Start: 2023-06-01 | End: 2023-06-03 | Stop reason: HOSPADM

## 2023-06-01 RX ORDER — CEFAZOLIN SODIUM/WATER 2 G/20 ML
2 SYRINGE (ML) INTRAVENOUS EVERY 8 HOURS
Status: DISCONTINUED | OUTPATIENT
Start: 2023-06-01 | End: 2023-06-01

## 2023-06-01 RX ORDER — FENTANYL CITRATE 50 UG/ML
25 INJECTION, SOLUTION INTRAMUSCULAR; INTRAVENOUS EVERY 5 MIN PRN
Status: DISCONTINUED | OUTPATIENT
Start: 2023-06-01 | End: 2023-06-01 | Stop reason: HOSPADM

## 2023-06-01 RX ORDER — DIPHENHYDRAMINE HCL 25 MG
25 CAPSULE ORAL EVERY 6 HOURS PRN
Status: DISCONTINUED | OUTPATIENT
Start: 2023-06-01 | End: 2023-06-03 | Stop reason: HOSPADM

## 2023-06-01 RX ORDER — PROPOFOL 10 MG/ML
INJECTION, EMULSION INTRAVENOUS CONTINUOUS PRN
Status: DISCONTINUED | OUTPATIENT
Start: 2023-06-01 | End: 2023-06-01

## 2023-06-01 RX ORDER — METHYLPREDNISOLONE SODIUM SUCCINATE 125 MG/2ML
125 INJECTION, POWDER, LYOPHILIZED, FOR SOLUTION INTRAMUSCULAR; INTRAVENOUS ONCE
Status: COMPLETED | OUTPATIENT
Start: 2023-06-01 | End: 2023-06-01

## 2023-06-01 RX ORDER — NALOXONE HYDROCHLORIDE 0.4 MG/ML
0.4 INJECTION, SOLUTION INTRAMUSCULAR; INTRAVENOUS; SUBCUTANEOUS
Status: DISCONTINUED | OUTPATIENT
Start: 2023-06-01 | End: 2023-06-03 | Stop reason: HOSPADM

## 2023-06-01 RX ORDER — MORPHINE SULFATE 2 MG/ML
2 INJECTION, SOLUTION INTRAMUSCULAR; INTRAVENOUS
Status: DISCONTINUED | OUTPATIENT
Start: 2023-06-01 | End: 2023-06-03 | Stop reason: HOSPADM

## 2023-06-01 RX ORDER — ONDANSETRON 2 MG/ML
4 INJECTION INTRAMUSCULAR; INTRAVENOUS EVERY 6 HOURS PRN
Status: DISCONTINUED | OUTPATIENT
Start: 2023-06-01 | End: 2023-06-03 | Stop reason: HOSPADM

## 2023-06-01 RX ORDER — ONDANSETRON 2 MG/ML
4 INJECTION INTRAMUSCULAR; INTRAVENOUS EVERY 30 MIN PRN
Status: DISCONTINUED | OUTPATIENT
Start: 2023-06-01 | End: 2023-06-01 | Stop reason: HOSPADM

## 2023-06-01 RX ORDER — DEXMEDETOMIDINE HYDROCHLORIDE 4 UG/ML
INJECTION, SOLUTION INTRAVENOUS PRN
Status: DISCONTINUED | OUTPATIENT
Start: 2023-06-01 | End: 2023-06-01

## 2023-06-01 RX ORDER — DIPHENHYDRAMINE HYDROCHLORIDE 50 MG/ML
25 INJECTION INTRAMUSCULAR; INTRAVENOUS EVERY 6 HOURS PRN
Status: DISCONTINUED | OUTPATIENT
Start: 2023-06-01 | End: 2023-06-03 | Stop reason: HOSPADM

## 2023-06-01 RX ORDER — POLYETHYLENE GLYCOL 3350 17 G/17G
17 POWDER, FOR SOLUTION ORAL DAILY
Status: DISCONTINUED | OUTPATIENT
Start: 2023-06-02 | End: 2023-06-03 | Stop reason: HOSPADM

## 2023-06-01 RX ADMIN — REMIFENTANIL HYDROCHLORIDE 0.05 MCG/KG/MIN: 1 INJECTION, POWDER, LYOPHILIZED, FOR SOLUTION INTRAVENOUS at 12:24

## 2023-06-01 RX ADMIN — HYDROMORPHONE HYDROCHLORIDE 0.4 MG: 0.2 INJECTION, SOLUTION INTRAMUSCULAR; INTRAVENOUS; SUBCUTANEOUS at 17:38

## 2023-06-01 RX ADMIN — HYDROMORPHONE HYDROCHLORIDE 0.5 MG: 1 INJECTION, SOLUTION INTRAMUSCULAR; INTRAVENOUS; SUBCUTANEOUS at 14:01

## 2023-06-01 RX ADMIN — ROCURONIUM BROMIDE 30 MG: 50 INJECTION, SOLUTION INTRAVENOUS at 15:01

## 2023-06-01 RX ADMIN — ACETAMINOPHEN 975 MG: 325 TABLET ORAL at 21:31

## 2023-06-01 RX ADMIN — PHENYLEPHRINE HYDROCHLORIDE 100 MCG: 10 INJECTION INTRAVENOUS at 14:17

## 2023-06-01 RX ADMIN — LIDOCAINE HYDROCHLORIDE 100 MG: 20 INJECTION, SOLUTION INFILTRATION; PERINEURAL at 12:52

## 2023-06-01 RX ADMIN — FENTANYL CITRATE 100 MCG: 50 INJECTION, SOLUTION INTRAMUSCULAR; INTRAVENOUS at 12:52

## 2023-06-01 RX ADMIN — KETOROLAC TROMETHAMINE 15 MG: 15 INJECTION, SOLUTION INTRAMUSCULAR; INTRAVENOUS at 19:57

## 2023-06-01 RX ADMIN — PROPOFOL 200 MCG/KG/MIN: 10 INJECTION, EMULSION INTRAVENOUS at 12:52

## 2023-06-01 RX ADMIN — ROCURONIUM BROMIDE 20 MG: 50 INJECTION, SOLUTION INTRAVENOUS at 14:35

## 2023-06-01 RX ADMIN — CHLORHEXIDINE GLUCONATE 0.12% ORAL RINSE 15 ML: 1.2 LIQUID ORAL at 21:33

## 2023-06-01 RX ADMIN — PHENYLEPHRINE HYDROCHLORIDE 50 MCG: 10 INJECTION INTRAVENOUS at 14:07

## 2023-06-01 RX ADMIN — ROCURONIUM BROMIDE 30 MG: 50 INJECTION, SOLUTION INTRAVENOUS at 15:30

## 2023-06-01 RX ADMIN — LABETALOL HYDROCHLORIDE 10 MG: 5 INJECTION INTRAVENOUS at 17:03

## 2023-06-01 RX ADMIN — ROCURONIUM BROMIDE 10 MG: 50 INJECTION, SOLUTION INTRAVENOUS at 14:46

## 2023-06-01 RX ADMIN — Medication 2 G: at 12:39

## 2023-06-01 RX ADMIN — SODIUM CHLORIDE, POTASSIUM CHLORIDE, SODIUM LACTATE AND CALCIUM CHLORIDE: 600; 310; 30; 20 INJECTION, SOLUTION INTRAVENOUS at 11:46

## 2023-06-01 RX ADMIN — SUGAMMADEX 200 MG: 100 INJECTION, SOLUTION INTRAVENOUS at 16:03

## 2023-06-01 RX ADMIN — ONDANSETRON 4 MG: 2 INJECTION INTRAMUSCULAR; INTRAVENOUS at 15:47

## 2023-06-01 RX ADMIN — LABETALOL HYDROCHLORIDE 10 MG: 5 INJECTION INTRAVENOUS at 16:50

## 2023-06-01 RX ADMIN — LABETALOL HYDROCHLORIDE 10 MG: 5 INJECTION INTRAVENOUS at 17:30

## 2023-06-01 RX ADMIN — FENTANYL CITRATE 50 MCG: 50 INJECTION, SOLUTION INTRAMUSCULAR; INTRAVENOUS at 16:45

## 2023-06-01 RX ADMIN — REMIFENTANIL HYDROCHLORIDE 0.1 MCG/KG/MIN: 1 INJECTION, POWDER, LYOPHILIZED, FOR SOLUTION INTRAVENOUS at 13:01

## 2023-06-01 RX ADMIN — DEXMEDETOMIDINE HYDROCHLORIDE 12 MCG: 200 INJECTION INTRAVENOUS at 13:13

## 2023-06-01 RX ADMIN — SENNOSIDES AND DOCUSATE SODIUM 1 TABLET: 50; 8.6 TABLET ORAL at 21:31

## 2023-06-01 RX ADMIN — HYDROMORPHONE HYDROCHLORIDE 0.4 MG: 0.2 INJECTION, SOLUTION INTRAMUSCULAR; INTRAVENOUS; SUBCUTANEOUS at 17:01

## 2023-06-01 RX ADMIN — DEXMEDETOMIDINE HYDROCHLORIDE 8 MCG: 200 INJECTION INTRAVENOUS at 13:23

## 2023-06-01 RX ADMIN — PROPOFOL 200 MG: 10 INJECTION, EMULSION INTRAVENOUS at 12:52

## 2023-06-01 RX ADMIN — PHENYLEPHRINE HYDROCHLORIDE 50 MCG: 10 INJECTION INTRAVENOUS at 14:05

## 2023-06-01 RX ADMIN — PHENYLEPHRINE HYDROCHLORIDE 50 MCG: 10 INJECTION INTRAVENOUS at 13:43

## 2023-06-01 RX ADMIN — POTASSIUM CHLORIDE, DEXTROSE MONOHYDRATE AND SODIUM CHLORIDE: 150; 5; 450 INJECTION, SOLUTION INTRAVENOUS at 19:47

## 2023-06-01 RX ADMIN — MORPHINE SULFATE 2 MG: 2 INJECTION, SOLUTION INTRAMUSCULAR; INTRAVENOUS at 19:39

## 2023-06-01 RX ADMIN — MIDAZOLAM 2 MG: 1 INJECTION INTRAMUSCULAR; INTRAVENOUS at 12:42

## 2023-06-01 RX ADMIN — CEFAZOLIN SODIUM 2 G: 2 INJECTION, SOLUTION INTRAVENOUS at 19:57

## 2023-06-01 RX ADMIN — ROCURONIUM BROMIDE 70 MG: 50 INJECTION, SOLUTION INTRAVENOUS at 12:52

## 2023-06-01 RX ADMIN — CHLORHEXIDINE GLUCONATE 10 ML: 1.2 SOLUTION ORAL at 11:45

## 2023-06-01 RX ADMIN — ROCURONIUM BROMIDE 20 MG: 50 INJECTION, SOLUTION INTRAVENOUS at 15:18

## 2023-06-01 RX ADMIN — METHYLPREDNISOLONE SODIUM SUCCINATE 125 MG: 125 INJECTION, POWDER, FOR SOLUTION INTRAMUSCULAR; INTRAVENOUS at 19:57

## 2023-06-01 RX ADMIN — MORPHINE SULFATE 2 MG: 2 INJECTION, SOLUTION INTRAMUSCULAR; INTRAVENOUS at 21:32

## 2023-06-01 RX ADMIN — FENTANYL CITRATE 50 MCG: 50 INJECTION, SOLUTION INTRAMUSCULAR; INTRAVENOUS at 16:53

## 2023-06-01 RX ADMIN — HYDROMORPHONE HYDROCHLORIDE 0.4 MG: 0.2 INJECTION, SOLUTION INTRAMUSCULAR; INTRAVENOUS; SUBCUTANEOUS at 17:11

## 2023-06-01 RX ADMIN — METHYLPREDNISOLONE 125 MG: 125 INJECTION, POWDER, LYOPHILIZED, FOR SOLUTION INTRAMUSCULAR; INTRAVENOUS at 13:09

## 2023-06-01 ASSESSMENT — ACTIVITIES OF DAILY LIVING (ADL)
ADLS_ACUITY_SCORE: 35

## 2023-06-01 NOTE — ANESTHESIA POSTPROCEDURE EVALUATION
Patient: Saul Wilson    Procedure: Procedure(s):  LEFORT 1 OSTEOTOMY BILATERAL SAGITTAL SPLIT OSTEOTOMY       Anesthesia Type:  General    Note:  Disposition: Inpatient   Postop Pain Control: Uneventful            Sign Out: Well controlled pain   PONV: No   Neuro/Psych: Uneventful            Sign Out: Acceptable/Baseline neuro status   Airway/Respiratory: Uneventful            Sign Out: Acceptable/Baseline resp. status   CV/Hemodynamics: Uneventful            Sign Out: Acceptable CV status; No obvious hypovolemia; No obvious fluid overload   Other NRE: NONE   DID A NON-ROUTINE EVENT OCCUR? No           Last vitals:  Vitals Value Taken Time   /87 06/01/23 1800   Temp     Pulse 75 06/01/23 1807   Resp 7 06/01/23 1807   SpO2 89 % 06/01/23 1807   Vitals shown include unvalidated device data.    Electronically Signed By: Cristian Palomo MD  June 1, 2023  6:08 PM

## 2023-06-01 NOTE — PROGRESS NOTES
Oral & Maxillofacial Surgery Brief Note:    Immediate Brief Op Note Completed.    Formal Operative Report Dictated.      Procedure Summary:  1) Lefort I Osteotomy  2) Bilateral Sagittal Split Ramus Osteotomy    Operation Time:  2:40    Complications:  None apparent    Occlusion:  As planned, elastics in place.    Elastic Pattern:  Bilateral Camel    Disposition:  1. Admit to Floor  2. PO Orders Placed.  3. Anticipated Discharge Date is Saturday AM    Signature:  Morris Barr DDS    [facility]  [address], [address]  [place] [address]  Clinic/On-call Phone: [phone]  Cell Phone: [phone] (for faster return call use on-call number)  Clinic Fax: [phone]

## 2023-06-01 NOTE — ANESTHESIA PROCEDURE NOTES
Airway       Patient location during procedure: OR       Procedure Start/Stop Times: 6/1/2023 12:56 PM  Staff -        Anesthesiologist:  Jia Doran MD       CRNA: Mayra Ruff APRN CRNA       Other Anesthesia Staff: Rosalba Vargas       Performed By: SRNA  Consent for Airway        Urgency: elective  Indications and Patient Condition       Indications for airway management: yuri-procedural       Induction type:intravenous       Mask difficulty assessment: 2 - vent by mask + OA or adjuvant +/- NMBA    Final Airway Details       Final airway type: endotracheal airway       Successful airway: Nasal and TALHA  Endotracheal Airway Details        ETT size (mm): 8.0       Cuffed: yes       Successful intubation technique: video laryngoscopy       VL Blade Size: Glidescope 4       Grade View of Cords: 1       Position: Left (nare)       Measured from: nares       Bite block used: None    Post intubation assessment        Placement verified by: capnometry, equal breath sounds and chest rise        Number of attempts at approach: 1       Number of other approaches attempted: 0       Secured with: silk tape       Ease of procedure: easy       Dentition: Intact and Unchanged    Medication(s) Administered   Medication Administration Time: 6/1/2023 12:56 PM

## 2023-06-01 NOTE — INTERVAL H&P NOTE
"I have reviewed the surgical (or preoperative) H&P that is linked to this encounter, and examined the patient. There are no significant changes    Clinical Conditions Present on Arrival:  Clinically Significant Risk Factors Present on Admission                  # Overweight: Estimated body mass index is 25.34 kg/m  as calculated from the following:    Height as of this encounter: 1.905 m (6' 3\").    Weight as of this encounter: 91.9 kg (202 lb 11.2 oz).       "

## 2023-06-01 NOTE — ANESTHESIA PREPROCEDURE EVALUATION
Anesthesia Pre-Procedure Evaluation    Patient: Saul Wilson   MRN: [id number] : 2000        Procedure : Procedure(s):  LEFORT 1 OSTEOTOMY BILATERAL SAGITTAL SPLIT OSTEOTOMY          No past medical history on file.   Past Surgical History:   Procedure Laterality Date     TYMPANOPLASTY Right 2022    Procedure: right postauricular revision tympanoplasty;  Surgeon: Mayra Sanders MD;  Location: [facility] OR      No Known Allergies   Social History     Tobacco Use     Smoking status: Some Days     Smokeless tobacco: Never   Vaping Use     Vaping status: Not on file   Substance Use Topics     Alcohol use: Not on file     Comment: 1 glass per month      Wt Readings from Last 1 Encounters:   01/10/23 92.1 kg (203 lb)        Prior to Admission medications    Not on File       Anesthesia Evaluation   Pt has had prior anesthetic.     No history of anesthetic complications       ROS/MED HX  ENT/Pulmonary:    (-) sleep apnea   Neurologic: Comment: Traumatic brain injury 2021 with skull fracture, required a couple ear surgeries post.  No long term sequela     (+) migraines,     Cardiovascular:       METS/Exercise Tolerance:     Hematologic:       Musculoskeletal:       GI/Hepatic:    (-) GERD   Renal/Genitourinary:       Endo:       Psychiatric/Substance Use:     (+) psychiatric history (ADHD)     Infectious Disease:       Malignancy:       Other:            Physical Exam    Airway        Mallampati: II   TM distance: > 3 FB   Neck ROM: full   Mouth opening: > 3 cm    Respiratory Devices and Support         Dental       (+) Minor Abnormalities - some fillings, tiny chips      Cardiovascular   cardiovascular exam normal          Pulmonary   pulmonary exam normal                OUTSIDE LABS:  CBC: No results found for: WBC, HGB, HCT, PLT  BMP: No results found for: NA, POTASSIUM, CHLORIDE, CO2, BUN, CR, GLC  COAGS: No results found for: PTT, INR, FIBR  POC: No results found for: BGM, HCG, HCGS  HEPATIC:  No results found for: ALBUMIN, PROTTOTAL, ALT, AST, GGT, ALKPHOS, BILITOTAL, BILIDIRECT, MARKO  OTHER: No results found for: PH, LACT, A1C, MARZENA, PHOS, MAG, LIPASE, AMYLASE, TSH, T4, T3, CRP, SED    Anesthesia Plan    ASA Status:  1   NPO Status:  NPO Appropriate    Anesthesia Type: General.     - Airway: ETT   Induction: Intravenous.   Maintenance: Balanced.   Techniques and Equipment:     - Airway: Video-Laryngoscope         Consents    Anesthesia Plan(s) and associated risks, benefits, and realistic alternatives discussed. Questions answered and patient/representative(s) expressed understanding.    - Discussed:     - Discussed with:  Patient         Postoperative Care    Pain management: IV analgesics, Multi-modal analgesia.   PONV prophylaxis: Ondansetron (or other 5HT-3), Background Propofol Infusion     Comments:    Other Comments: H&P reviewed    Ding  Vasoconstrictor spray to both nares  Start with left nare, as this is more open for him  Propofol gtt as primary anesthetic            Yg Burns MD

## 2023-06-01 NOTE — BRIEF OP NOTE
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  Complications: None.  Implants:   Implant Name Type Inv. Item Serial No.  Lot No. LRB No. Used Action   WIRE SURGICAL STEEL 24GA 2 DS-24 - [id number] Wire WIRE SURGICAL STEEL 24GA 2 DS-24  [employer] [facility]- [id number] N/A 1 Implanted   WIRE SURGICAL STEEL 26GA 0 DS-26 - [id number] Wire WIRE SURGICAL STEEL 26GA 0 DS-26  [employer] [facility]- [id number] N/A 1 Implanted and Explanted   IMP SCR STRK CROSS 1.7X5MM SELF TAP [id number] - [id number] Metallic Hardware/Edroy IMP SCR STRK CROSS 1.7X5MM SELF TAP [id number]  [employer] [id number] Left 1 Implanted   IMP SCR STRK CROSS 1.7X5MM SELF TAP [id number] - [id number] Metallic Hardware/Edroy IMP SCR STRK CROSS 1.7X5MM SELF TAP [id number]  [employer] [id number] Right 1 Implanted   IMP PLATE STRK ORBITAL 08H [id number] - [id number] Metallic Hardware/Edroy IMP PLATE STRK ORBITAL 08H [id number]  [employer] [id number] N/A 1 Implanted   1.7mm self-drilling screws, SILVER    ZHANG [id number] Left 2 Implanted   1.7mm self-driving screws, SILVER    ZHANG [id number] Right 2 Implanted   IMP SCR STRK 2.0X04MM [id number] - [id number] Metallic Hardware/Edroy IMP SCR STRK 2.0X04MM [id number]  [employer]  Left 4 Implanted   IMP SCR STRK 2.0X04MM [id number] - [id number] Metallic Hardware/Edroy IMP SCR STRK 2.0X04MM [id number]  [employer]  Right 4 Implanted   IMP SCR STRK 2.0X10MM [id number] - [id number] Metallic Hardware/Edroy IMP SCR STRK 2.0X10MM [id number]   [employer]  Left 1 Implanted   IMP SCR STRK 2.0X10MM [id number] - [id number] Metallic Hardware/Newberry IMP SCR STRK 2.0X10MM [id number]  [employer]  Right 1 Implanted   IMP PLATE SARAH SAG 4 HOLE 3MM [id number] - [id number] Metallic Hardware/Newberry IMP PLATE SARAH SAG 4 HOLE 3MM [id number]  [employer]  Right 1 Implanted   IMP PLATE SARAH SAG 4 HOLE 3MM [id number] - [id number] Metallic Hardware/Newberry IMP PLATE SARAH SAG 4 HOLE 3MM [id number]  [employer]  Right 1 Implanted

## 2023-06-01 NOTE — ANESTHESIA CARE TRANSFER NOTE
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    Anesthesia Type:   General     Note:    Oropharynx: oropharynx clear of all foreign objects and spontaneously breathing  Level of Consciousness: awake  Oxygen Supplementation: face mask  Level of Supplemental Oxygen (L/min / FiO2): 6  Independent Airway: airway patency satisfactory and stable  Dentition: dentition unchanged  Vital Signs Stable: post-procedure vital signs reviewed and stable  Report to RN Given: handoff report given  Patient transferred to: PACU  Comments: Neuromuscular blockade reversed with sugammadex, spontaneous respirations, adequate tidal volumes, followed commands to voice, extubated atraumatically, extubated with suction, airway patent after extubation.  Oxygen via facemask at 6 liters per minute to PACU. Oxygen tubing connected to wall O2 in PACU, SpO2, NiBP, and EKG monitors and alarms on and functioning, David Hugger warmer connected to patient gown, report on patient's clinical status given to PACU RN, RN questions answered.     Handoff Report: Identifed the Patient, Identified the Reponsible Provider, Reviewed the pertinent medical history, Discussed the surgical course, Reviewed Intra-OP anesthesia mangement and issues during anesthesia, Set expectations for post-procedure period and Allowed opportunity for questions and acknowledgement of understanding      Vitals:  Vitals Value Taken Time   /95 06/01/23 1632   Temp 98.8    Pulse 79 06/01/23 1634   Resp 8 06/01/23 1634   SpO2 95 % 06/01/23 1634   Vitals shown include unvalidated device data.    Electronically Signed By: Christine Marie Volp Hodgkins, CRNA, APRN CRNA  June 1, 2023  4:35 PM

## 2023-06-02 LAB — GLUCOSE BLDC GLUCOMTR-MCNC: 179 MG/DL (ref 70–99)

## 2023-06-02 PROCEDURE — 250N000011 HC RX IP 250 OP 636: Performed by: DENTIST

## 2023-06-02 PROCEDURE — 258N000003 HC RX IP 258 OP 636: Performed by: DENTIST

## 2023-06-02 PROCEDURE — 250N000013 HC RX MED GY IP 250 OP 250 PS 637: Performed by: DENTIST

## 2023-06-02 PROCEDURE — 120N000001 HC R&B MED SURG/OB

## 2023-06-02 RX ADMIN — ACETAMINOPHEN 975 MG: 325 TABLET ORAL at 06:14

## 2023-06-02 RX ADMIN — OXYCODONE HYDROCHLORIDE 10 MG: 5 TABLET ORAL at 17:32

## 2023-06-02 RX ADMIN — KETOROLAC TROMETHAMINE 15 MG: 15 INJECTION, SOLUTION INTRAMUSCULAR; INTRAVENOUS at 13:03

## 2023-06-02 RX ADMIN — CEFAZOLIN SODIUM 2 G: 2 INJECTION, SOLUTION INTRAVENOUS at 20:49

## 2023-06-02 RX ADMIN — METHYLPREDNISOLONE SODIUM SUCCINATE 125 MG: 125 INJECTION, POWDER, FOR SOLUTION INTRAMUSCULAR; INTRAVENOUS at 00:12

## 2023-06-02 RX ADMIN — CHLORHEXIDINE GLUCONATE 0.12% ORAL RINSE 15 ML: 1.2 LIQUID ORAL at 20:33

## 2023-06-02 RX ADMIN — SENNOSIDES AND DOCUSATE SODIUM 1 TABLET: 50; 8.6 TABLET ORAL at 20:33

## 2023-06-02 RX ADMIN — MORPHINE SULFATE 2 MG: 2 INJECTION, SOLUTION INTRAMUSCULAR; INTRAVENOUS at 00:12

## 2023-06-02 RX ADMIN — METHYLPREDNISOLONE SODIUM SUCCINATE 125 MG: 125 INJECTION, POWDER, FOR SOLUTION INTRAMUSCULAR; INTRAVENOUS at 08:26

## 2023-06-02 RX ADMIN — CEFAZOLIN SODIUM 2 G: 2 INJECTION, SOLUTION INTRAVENOUS at 11:31

## 2023-06-02 RX ADMIN — KETOROLAC TROMETHAMINE 15 MG: 15 INJECTION, SOLUTION INTRAMUSCULAR; INTRAVENOUS at 08:26

## 2023-06-02 RX ADMIN — OXYCODONE HYDROCHLORIDE 10 MG: 5 TABLET ORAL at 13:04

## 2023-06-02 RX ADMIN — CHLORHEXIDINE GLUCONATE 0.12% ORAL RINSE 15 ML: 1.2 LIQUID ORAL at 08:26

## 2023-06-02 RX ADMIN — CEFAZOLIN SODIUM 2 G: 2 INJECTION, SOLUTION INTRAVENOUS at 02:36

## 2023-06-02 RX ADMIN — POTASSIUM CHLORIDE, DEXTROSE MONOHYDRATE AND SODIUM CHLORIDE: 150; 5; 450 INJECTION, SOLUTION INTRAVENOUS at 06:14

## 2023-06-02 RX ADMIN — OXYCODONE HYDROCHLORIDE 10 MG: 5 TABLET ORAL at 21:20

## 2023-06-02 RX ADMIN — METHYLPREDNISOLONE SODIUM SUCCINATE 125 MG: 125 INJECTION, POWDER, FOR SOLUTION INTRAMUSCULAR; INTRAVENOUS at 13:03

## 2023-06-02 RX ADMIN — ACETAMINOPHEN 975 MG: 325 TABLET ORAL at 21:03

## 2023-06-02 RX ADMIN — POTASSIUM CHLORIDE, DEXTROSE MONOHYDRATE AND SODIUM CHLORIDE: 150; 5; 450 INJECTION, SOLUTION INTRAVENOUS at 17:34

## 2023-06-02 RX ADMIN — METHYLPREDNISOLONE SODIUM SUCCINATE 125 MG: 125 INJECTION, POWDER, FOR SOLUTION INTRAMUSCULAR; INTRAVENOUS at 04:06

## 2023-06-02 RX ADMIN — KETOROLAC TROMETHAMINE 15 MG: 15 INJECTION, SOLUTION INTRAMUSCULAR; INTRAVENOUS at 02:36

## 2023-06-02 ASSESSMENT — ACTIVITIES OF DAILY LIVING (ADL)
TOILETING_ISSUES: NO
ADLS_ACUITY_SCORE: 18
WALKING_OR_CLIMBING_STAIRS_DIFFICULTY: NO
ADLS_ACUITY_SCORE: 35
ADLS_ACUITY_SCORE: 35
DRESSING/BATHING_DIFFICULTY: NO
ADLS_ACUITY_SCORE: 35
DOING_ERRANDS_INDEPENDENTLY_DIFFICULTY: NO
ADLS_ACUITY_SCORE: 35
WEAR_GLASSES_OR_BLIND: NO
ADLS_ACUITY_SCORE: 18
DIFFICULTY_EATING/SWALLOWING: NO
FALL_HISTORY_WITHIN_LAST_SIX_MONTHS: NO
CHANGE_IN_FUNCTIONAL_STATUS_SINCE_ONSET_OF_CURRENT_ILLNESS/INJURY: NO
ADLS_ACUITY_SCORE: 35
CONCENTRATING,_REMEMBERING_OR_MAKING_DECISIONS_DIFFICULTY: NO
ADLS_ACUITY_SCORE: 18

## 2023-06-02 NOTE — PROGRESS NOTES
OMS Progress Note    A/P: Saul Wilson is a 23 year old year old male s/p maxillary lefort one osteotmy and mandibular bilateral sagitta split ramus osteotomy  1. Currently progressing well  2. Continue IV antibiotics during admission  3. Continue to encourage PO intake  4. Continue to encourage PO pain medication  5. Anticipate discharge to home in 1-2 day pending progress    Matt Croft DDS  [facility]  [address], [address]  [place] [address]  Clinic/On call [phone]  Clinic Fax [phone]      S: Saul Wilson is a 23 year old year old male s/p maxillary lefort one osteotmy and mandibular bilateral sagitta split ramus osteotomy  Pt is comfortably resting in bed.   Pt had no significant events since transferred to floor from PACU     O:   Temp:  [98.1  F (36.7  C)-98.8  F (37.1  C)] 98.1  F (36.7  C)  Pulse:  [71-93] 92  Resp:  [10-21] 12  BP: (134-161)/() 140/79  FiO2 (%):  [40 %-50 %] 40 %  SpO2:  [92 %-99 %] 97 %  Temp (24hrs), Av.3  F (36.8  C), Min:98.1  F (36.7  C), Max:98.8  F (37.1  C)       Intake/Output Summary (Last 24 hours) at 2023 0716  Last data filed at 2023 0618  Gross per 24 hour   Intake 2218 ml   Output 200 ml   Net 2018 ml          Gen: lying in bed, alert, oriented x 3, NAD    HEENT  E/O  Normocephalic; Mild post operative swelling consistent with surgery  Palpation of facial bones reveals no bony step offs or mobility  Eyes: EOMI, PERRL, No double vision  Ears: external ears atraumatic, gross auditory function intact  External nose atraumatic, nares patent  Neck: Trachea midline, no cervical lymphandenopathy  TMJ: CHAIM 20, trismus mild  CN V2/V3 bilateral: paraesthesia to light touch consistent with surgery  I/O  Oral mucosa: pink, incisions: closed, lips: mild edema  Hard/soft palate: no edema, no edema, uvula: visualized, non deviated  Tongue: no edema, non raised,  no ulcerations   FOM: soft, non tender, no  ecchymosis  No mobility of maxilla/mandible, maxillary mucosal tissues with blanching and brisk re-profusion upon palpation. Occlusion: stable     Gen: lying in bed, alert, oriented x 3, NAD   Skin: no rash seen   Breathing room air without difficulty   Abd: no distension, no tenderness, no guarding, no rigidity  Ext: no extremity edema, full ROM   Neuro: Cranial nerve II-XII grossly intact, motor power V/V all extremities   Line: PIV

## 2023-06-02 NOTE — CONSULTS
"NUTRITION EDUCATION    REASON FOR ASSESSMENT:  Nutrition education on Jaw Surgery Diet    CURRENT DIET:  Full Liquid Jaw Surgery Diet    NUTRITION HISTORY:  Deferred    Pt to go home with dad. Encouraged protein rich, nutrient dense liquids/pureed foods. Dad reported having protein drinks at home for pt. They eat \"healthy\" already. No questions.     NUTRITION DIAGNOSIS:  Food- and nutrition-related knowledge deficit R/t lack of prior exposure to information AEB recent jaw surgery.    INTERVENTIONS:    Nutrition Prescription:  Recommended adequate calories and protein to promote healing, several small meals per day, and use of high protein oral supplements.    Implementation:    Assessed learning needs, learning preferences, and willingness to learn    Nutrition Education  (Content):  a) Provided handout  What to Eat After Jaw Surgery   b) Described diet progression per guidelines listed in handout  c) Discussed importance of small meals    Medical Food Supplements - recommended use of a high protein nutritional supplement    Anticipate good compliance    Diet Education - refer to Education Flowsheet    Goals:    Patient verbalizes understanding of diet by stating 1+ tip to implement when home    All of the above goals met during the education session    Follow Up:    Provided RD contact information for future questions        Avani Olivares RD, LD      "

## 2023-06-02 NOTE — OP NOTE
Procedure Date: 06/01/2023    PREOPERATIVE DIAGNOSES:    1.  Maxillary AP hypoplasia with asymmetry.  2.  Mandibular AP hyperplasia with asymmetry.  3.  Skeletal class 3 malocclusion.  4.  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  5.  Masticatory insufficiency.    POSTOPERATIVE DIAGNOSES:    1.  Maxillary AP hypoplasia with asymmetry.  2.  Mandibular AP hyperplasia with asymmetry.  3.  Skeletal class 3 malocclusion.  4.  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  5.  Masticatory insufficiency.    PROCEDURES PERFORMED:    1.  Maxillary advancement with asymmetric corrections via [place] I osteotomy.  2.  Mandibular setback/rotational correction via bilateral sagittal split ramus osteotomy.    SURGEON:  Morris Barr DDS    ASSISTANT SURGEON:  Steve Barraza DDS    :  JOSE Watson    ANESTHESIA:    1.  General via nasal endotracheal tube.  2.  Adjunctive local anesthesia with 2% lidocaine with 1:100,000 epinephrine via maxillary and mandibular vestibular infiltrations.    BRIEF HISTORY AND INDICATION FOR OPERATION:  The patient is a 23-year-old white man with a skeletal class 3 dental facial deformity with significant asymmetry.  He has been in presurgical orthodontics for correction of this discrepancy for the past year.  He has now completed leveling and alignment by the orthodontic team.  Virtual surgical planning as well as model surgery has been completed.  Ultimately, his surgical plan calls for maxillary advancement in combination with rotational correction of the mandible via [place] I osteotomy and bilateral sagittal split ramus osteotomies, respectively.  All risks, benefits, potential complications have been reviewed in detail.  These include, but not limited to pain, bleeding, bruising, infection, inflammation and nerve damage.  We specifically discussed interruption of V2 and V3 trigeminal nerve complexes as it relates to surgery.  Essentially, this  will equate to temporary numbness of the left upper lip and lateral nose for 2-3 months, palate for 1 year, lower lip and chin for 6-9 months.  The patient understands that some hypoesthesia could be permanent.  Other issues such as malunion, nonunion, relapse, hardware failure, poor cosmetic outcome, ongoing TMJ dysfunction have all been reviewed.  Consent has been verified.    DESCRIPTION OF PROCEDURE:  The patient was brought to operative suite #21.  He was seated supine.  He was sedated and following adequate depth, a nasoendotracheal tube was inserted on the first attempt.  End-tidal CO2 bilateral breath sounds were confirmed.  Tube was then secured by both the anesthesia and oral and maxillofacial surgery teams.  A timeout per [facility] protocol was then performed.  The table was positioned.  He was prepped and positioned in the standard fashion.    A throat pack was placed.  Minor enameloplasty was completed on a couple selected teeth.  Attention was given to the maxilla first.  A 15 blade was used to make a maxillary vestibular incision through skin and subcutaneous tissues.  Bovie electrocautery was taken through periosteum down to bone.  Full-thickness flaps were then reflected, exposing the bilateral piriform rim, maxillary antrum and pterygoid region.  The nasal floor was then dissected carefully with a #1 Interior and [place] tail periosteal.  A 24-gauge wire was then passed through the anterior nasal spine and used as a handle.  With proper retraction in place, attention was then given to the [place] I level cuts.  These were completed with the oscillating saw at the [place] I level bilaterally.  The cement spatula osteotome and 1 ball osteotome were used at the lateral nasal walls bilaterally and at the buttress.  The cement spatula osteotome was then taken through the cartilaginous septum at the midline as well as the bony vomer.  The pterygoid osteotome was then used to release the maxilla  from the pterygoid bilaterally.  The maxilla was down fractured with ease.  No significant hemorrhage was noted.  Bony interferences were reduced posteriorly.  The maxilla was disjoined manually.  Both descending palatine artery complexes were identified and cauterized.  Further adjustments were made per our virtual surgical plan.  Hemostasis was confirmed.  The maxilla was wired to the mandible using the intermediate splint with 24 and 26-gauge fish loops.  The maxillomandibular complex was rotated into position until 4-point contact was gained.  We measured a 1 mm impaction anteriorly.  We confirmed to the lip relationship being ideal.  Following establishment of 4-point bony contact, hemostasis was reconfirmed.  A wire passing bur was placed at the zygomatic buttress bilaterally and a 24-gauge wire was passed.  Nu-Knit sponge was then placed posteriorly to aid in hemostasis.  The maxilla was then drawn into position and both 24-gauge implanted stainless steel wires were rosetted and secured.  Attention was then given to fixation at the piriform.  Two 4-hole [place] plates were adapted curvilinear on both sides in the standard fashion.  A 5 mm gold screw inferiorly and two 4 mm self-tapping screws superiorly were utilized.  In total, there were 2 implanted stainless steel wires at the zygomatic buttress within the bone in combination with two 4-hole [place] plates and 6 monocortical screws.  This completed the maxillary operation.  The patient was cut out of fixation and the intermediate position was noted to be ideal.  A small alar cinch suture was placed with 2-0 Vicryl, followed by running closure with 2 interrupted 4-0 Vicryl sutures.  Attention was now given to the mandible.  Local anesthetic had taken effect bilaterally.  The right sagittal split was addressed first.  A 15 blade was used to make a lateral incision through skin and subcutaneous tissues.  Bovie electrocautery was taken through the buccinator  muscle as well as periosteum down to bone.  Full-thickness lateral and medial flaps were reflected.  This was performed with the #9 periosteal as well as #4 Molt.  Eventually, I was able to enter into the retrolingular fossa via the subperiosteal plane.  The modified channel retractor was seated.  The nerve hook was used to confirm lingula.  A short guard barrel bur was then used to reduce the medial ramus.  The Yuri bur was used to complete the medial cut.  The reciprocating saw was then used to create the sagittal cut to the anticipated location of the lateral cut between the first and second molars.  The Yuri bur was then used to complete the lateral vertical cut through bony cortex and inferior border.  This was done with proper retraction in place.  The split was then initiated with a straight mushroom chisel at the medial and lateral segments, followed by the wedge osteotome and ultimately the Haile  and Haile instrument.  The split occurred uneventfully and at the level of the inferior alveolar nerve canal.  Upon propagation, I was able to free the nerve from the proximal segment without any trauma.  Proximal segment was then adjusted per virtual surgical plan.  Sharp bony edges were smoothed with a long guard barrel bur.  The site was irrigated and packed.  Attention was then given to the left sagittal split.  The exact same surgical sequence was utilized as per the right.  Upon propagation of the split, we noted that it occurred uneventfully and at the level of the inferior alveolar nerve canal.  I was able to free the nerve from the proximal segment without issue.  Bony interferences were adjusted as per the virtual surgical plan.  The occlusion was then wired into the final position using the final splint.  24 and 26-gauge wire loops were placed.  Both proximal segments were then adapted and contoured in proper positions.  A 4-hole [place] JUNIOR plate was adapted.  2.0 x 4 mm screws on  either side x4 were utilized.  One 10 mm lag screw was also placed on either side.  In total, 2 plates, 8 monocortical screws and 2 lag screws.  The patient was cut out of fixation.  The occlusion was noted to be reproducible and in the position as intended.  Both surgical wounds were irrigated and closed with a running 4-0 Vicryl suture on either side.  The throat pack was removed.  The patient was given back to anesthesia for wake up in recovery, which was uneventful.      ESTIMATED BLOOD LOSS:  200 mL.      REPLACEMENTS:  Per anesthesia.    COMPLICATIONS:  None apparent.    FINDINGS:  None significant.    DISPOSITION:  We will admit the patient postoperatively for IV antibiotic, fluid and steroid protocols.  His anticipated discharge will be right away on Saturday morning.  We would like him to get 24 hours of in-hospital treatment, but ultimately probably best to keep him overnight tomorrow night as 24 hours will not be until early evening tomorrow.  We will round on him in the morning and certainly assess progress if we have any challenging issues during hospitalization.  He will be on a soft, non-chew diet for a total of 6 weeks, liquid diet for the first 3-4 days.  His postoperative visits have been scheduled and postoperative medications have already been prescribed.    Morris Barr DDS        D: 2023   T: 2023   MT: rehana    Name:     CHESTER LISA  MRN:      [id number]        Account:        [id number]   :      2000           Procedure Date: 2023     Document: [id number]

## 2023-06-02 NOTE — PROGRESS NOTES
23 year old male patient who under went osteotomy.  A&O x4.   VSS.   A1 SBA.  Came up from recovery room at approx 6:30.  Wearing ice pack around head.  Given humidified face tent to wear.  Received prn  IV morphine 2mg x 2 for 8/10 pain.  Last dose given at 21:40.  Liquid diet.  No straws.  Resting comfortably.  Ambulated to BR with SBA.  Voiding without difficulty.  Producing bloody spit.  Calm and cooperative.

## 2023-06-02 NOTE — PROGRESS NOTES
Pt is AOx4, up independently in room, VSS on RA. IVF running at 100ml/hr. Pain controlled with scheduled ketorolac and PRN oxy. Discharge pending.

## 2023-06-02 NOTE — PROGRESS NOTES
Overnight    Pt vital signs stable. Utilizing jaw bra and humidified face tent. Able to sleep between cares. Pain well controlled utilizing morphine and scheduled toradol. Alert and oriented x4. SBA for activity. Voiding adequately. IV fluids infusing. Tolerated clears.

## 2023-06-03 VITALS
RESPIRATION RATE: 18 BRPM | TEMPERATURE: 97.2 F | OXYGEN SATURATION: 97 % | BODY MASS INDEX: 25.2 KG/M2 | HEART RATE: 75 BPM | SYSTOLIC BLOOD PRESSURE: 132 MMHG | HEIGHT: 75 IN | WEIGHT: 202.7 LBS | DIASTOLIC BLOOD PRESSURE: 76 MMHG

## 2023-06-03 PROCEDURE — 250N000011 HC RX IP 250 OP 636: Performed by: DENTIST

## 2023-06-03 PROCEDURE — 250N000013 HC RX MED GY IP 250 OP 250 PS 637: Performed by: DENTIST

## 2023-06-03 RX ADMIN — OXYCODONE HYDROCHLORIDE 5 MG: 5 TABLET ORAL at 01:52

## 2023-06-03 RX ADMIN — OXYCODONE HYDROCHLORIDE 10 MG: 5 TABLET ORAL at 08:02

## 2023-06-03 RX ADMIN — SENNOSIDES AND DOCUSATE SODIUM 1 TABLET: 50; 8.6 TABLET ORAL at 08:02

## 2023-06-03 RX ADMIN — CEFAZOLIN SODIUM 2 G: 2 INJECTION, SOLUTION INTRAVENOUS at 10:30

## 2023-06-03 RX ADMIN — CHLORHEXIDINE GLUCONATE 0.12% ORAL RINSE 15 ML: 1.2 LIQUID ORAL at 08:03

## 2023-06-03 RX ADMIN — ACETAMINOPHEN 975 MG: 325 TABLET ORAL at 06:26

## 2023-06-03 RX ADMIN — CEFAZOLIN SODIUM 2 G: 2 INJECTION, SOLUTION INTRAVENOUS at 02:44

## 2023-06-03 RX ADMIN — OXYCODONE HYDROCHLORIDE 10 MG: 5 TABLET ORAL at 12:01

## 2023-06-03 ASSESSMENT — ACTIVITIES OF DAILY LIVING (ADL)
ADLS_ACUITY_SCORE: 18

## 2023-06-03 NOTE — PLAN OF CARE
Date & Time: 6/3 5962-6641  Diagnosis: Maxillary hypoplasia  Procedures: 6/1 - osteotomy w/ Dr. Barr  Orientation/Cognitive: AOx4  VS/O2: VSS, RA  Mobility: Independent  Diet: Full liquid jaw   Pain Management: PRN oxycodone, scheduled tylenol, ice in jaw bra  Bowel & Bladder: Continent  Skin: Jaw - swollen, bruised  Abnormal Labs: WDL  Tele: NA  IV Access/Drips/Fluids: L PIV SL   Drains: NA  Tests: NA  Consults: Maxillofacial Surgery  Discharge Plan: Home 6/3    Patient discharged home with father & with all belongings. Patient has prescriptions and discharge instructions at home, patient states these were given before surgery.     RN last gave oxycodone 10mg PRN 6/3/23 @ 1200.

## 2023-06-03 NOTE — PROGRESS NOTES
"OMS PROGRESS NOTE:    SUBJECTIVE:  Saul Wilson is a 23-year-old year old male s/p maxillary lefort one osteotomy and mandibular bilateral sagittal split ramus osteotomy on 6/01/2023 with Sonia Barr and Madi    No acute overnight events.     Patient reports being ready to go home. His pain is controlled on oral pain meds. He is tolerating PO without PONV. He is ambulating and voiding.     OBJECTIVE:    Vital signs:   /76 (BP Location: Right arm)   Pulse 75   Temp 97.2  F (36.2  C) (Axillary)   Resp 18   Ht 1.905 m (6' 3\")   Wt 91.9 kg (202 lb 11.2 oz)   SpO2 97%   BMI 25.34 kg/m     Gen: lying in bed, alert, oriented x 3, No acute distress  Extra-oral: Soft bilateral facial swelling consistent with recent surgery. Symmetric V2 and V3 paresthesia consistent with recent surgery.   Intra-oral: Occlusion stable and reproducible with positive overbite and overjet. Floor of mouth non-elevated. Incisions hemostatic and sutures clean, moist and intact.   CV: Normal peripheral perfusion. Regular rate and rhythm.  Lungs: Non-labored breathing. Satting above 92% on room air.    Neuro: Moving all extremities  Psych: Cooperative, appropriate affect    ASSESSMENT :   Saul Wilson is a 23-year-old year old male s/p maxillary lefort one osteotomy and mandibular bilateral sagittal split ramus osteotomy on 6/01/2023 with Sonia Barr and Madi. Patient progressing as expected and stable for discharge.    PLAN/RECOMMENDATIONS:   1. Discharge to home today after noon pain meds  2. Full-liquid non-chew diet as instructed  3. No strenuous physical activity or heavy lifting  4. F/u scheduled with Dr. Barr on Wednesday  5. Prescriptions already filled and at home  6. Strict sinus precautions  7. All questions answered with patient and father to their satisfaction.     Justin Viveros DDS, MD  [facility]  [address], [address]  [place] [address]  Clinic/On call " [phone]  Clinic Fax [phone]

## 2023-06-03 NOTE — PROVIDER NOTIFICATION
MD Notification    Notified Person: MD    Notified Person Name: Felice    Notification Date/Time: 6/3 1115    Notification Interaction: Paged    Purpose of Notification: Patient wanting to discharge and wondering when MD was rounding    Orders Received: Pt rounded on and discharge orders put in

## 2023-06-03 NOTE — PROGRESS NOTES
Pt is A&OX4 and he verbalized pain andd offered Oxycodone.VSS and on RA.Pt is independent in the room.He received abx and PIV SL.

## 2023-06-03 NOTE — PLAN OF CARE
Goal Outcome Evaluation:    A&Ox4. VSS on Rm Air. PRN oxycodone given. Continues w/ mild facial swelling; jaw bra w/ ice utilized. Tolerating oral fluids. PIV SL. Up ambulating SBA to BR. Humidifier tent for comfort. Discharge pending.

## 2023-06-05 NOTE — DISCHARGE SUMMARY
S Discharge Summary:    Admission date: 6/01/2023    Admitting diagnosis:   1.  Maxillary AP hypoplasia with asymmetry.  2.  Mandibular AP hyperplasia with asymmetry.  3.  Skeletal class 3 malocclusion.  4.  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  5.  Masticatory insufficiency.    Discharge diagnosis:   Same    Attending: Morris Barr DDS    Service: Oral and Maxillofacial Surgery    Procedures completed:   1.  Maxillary advancement with asymmetric corrections via [place] I osteotomy.  2.  Mandibular setback/rotational correction via bilateral sagittal split ramus osteotomy.    Brief history:     The patient is a 23-year-old male with a skeletal class 3 dental facial deformity with significant asymmetry.  He has been in presurgical orthodontics for correction of this discrepancy for the past year.  He has now completed leveling and alignment by the orthodontic team.  Virtual surgical planning as well as model surgery has been completed.  Ultimately, his surgical plan calls for maxillary advancement in combination with rotational correction of the mandible via [place] I osteotomy and bilateral sagittal split ramus osteotomies, respectively.    Hospital Course:    Patient was admitted on 6/01/2023. Patient was taken to the OR on 6/01/2023 for Le [place] I osteotomy and bilateral sagittal split osteotomies. Patient had an uneventful preoperative, operative and postoperative course. Patient was transferred to the PACU in stable condition and then transferred to General Surgery unit. Following surgery, the patient continued to do well. Upon evaluation morning after surgery the patient was found to be progressing well. On the morning of discharge, he denied any PONV, was ambulating, voiding, tolerating PO, pain was well-controlled with PO pain meds, vitals were stable; overall assessment was that he was stable for discharge to home.     Prescriptions: provided pre-operatively    Diet: full-liquid,  non-chew    Activity: No strenuous physical activity or heavy lifting. Strict sinus precautions against smoking, forceful spitting, using a straw, closed-mouth sneezing or nose blowing.    Hygiene: shower normally avoiding direct water stream to wounds, salt water rinse, Peridex, brush teeth BID    Follow-up with Dr. Barr as scheduled next week    Condition at discharge: Stable    Justin Viveros DDS, MD  [facility]  [address], [address] [address]  Clinic/On call [phone]  Clinic Fax [phone]

## 2023-06-29 DIAGNOSIS — H90.11 CONDUCTIVE HEARING LOSS OF RIGHT EAR WITH UNRESTRICTED HEARING OF LEFT EAR: Primary | ICD-10-CM

## 2023-06-30 ENCOUNTER — OFFICE VISIT (OUTPATIENT)
Dept: OTOLARYNGOLOGY | Facility: CLINIC | Age: 23
End: 2023-06-30
Payer: COMMERCIAL

## 2023-06-30 ENCOUNTER — OFFICE VISIT (OUTPATIENT)
Dept: AUDIOLOGY | Facility: CLINIC | Age: 23
End: 2023-06-30
Attending: OTOLARYNGOLOGY
Payer: COMMERCIAL

## 2023-06-30 VITALS
WEIGHT: 191 LBS | DIASTOLIC BLOOD PRESSURE: 75 MMHG | SYSTOLIC BLOOD PRESSURE: 123 MMHG | HEIGHT: 75 IN | BODY MASS INDEX: 23.75 KG/M2 | HEART RATE: 61 BPM

## 2023-06-30 DIAGNOSIS — H90.11 CONDUCTIVE HEARING LOSS OF RIGHT EAR WITH UNRESTRICTED HEARING OF LEFT EAR: ICD-10-CM

## 2023-06-30 DIAGNOSIS — H90.11 CONDUCTIVE HEARING LOSS OF RIGHT EAR WITH UNRESTRICTED HEARING OF LEFT EAR: Primary | ICD-10-CM

## 2023-06-30 DIAGNOSIS — H72.91 PERFORATION OF RIGHT TYMPANIC MEMBRANE: ICD-10-CM

## 2023-06-30 PROCEDURE — 92504 EAR MICROSCOPY EXAMINATION: CPT | Mod: GC | Performed by: OTOLARYNGOLOGY

## 2023-06-30 PROCEDURE — 92550 TYMPANOMETRY & REFLEX THRESH: CPT | Performed by: AUDIOLOGIST

## 2023-06-30 PROCEDURE — 99213 OFFICE O/P EST LOW 20 MIN: CPT | Mod: 25 | Performed by: OTOLARYNGOLOGY

## 2023-06-30 PROCEDURE — 92557 COMPREHENSIVE HEARING TEST: CPT | Performed by: AUDIOLOGIST

## 2023-06-30 ASSESSMENT — PAIN SCALES - GENERAL: PAINLEVEL: NO PAIN (0)

## 2023-06-30 NOTE — PROGRESS NOTES
AUDIOLOGY REPORT    SUMMARY: Audiology visit completed. See audiogram for results.      RECOMMENDATIONS: Follow-up with ENT.        Renetta Salinas, CCC-A  Licensed Audiologist  [place] #[id number]

## 2023-06-30 NOTE — LETTER
2023       RE: Saul Wilson  [address]  [facility] [address]     Dear Colleague,    Thank you for referring your patient, Saul Wilson, to the [facility] [place] at [facility]. Please see a copy of my visit note below.      Neurotology Clinic        Name: Saul Wilson  MRN: [id number]  Age: 23 year old year old  : 2000    History of Present Illness:  Saul Wilson is a 22 year old male with a history of traumatic right tympanic membrane perforation, s/p right lateral technique tympanoplasty (revision) with bony canalplasty 23 who presents for follow up. Previously, patient had s/p post-auricular tympanoplasty with Biodesign synthetic graft placement in [place] in 2022 which failed. He was last seen in clinic on 1/10/2023 and was healing well. Here for 6 month follow up.    Today, he reports that he has been doing well without any issues. Denies otalgia, otorrhea or infections. His right hearing has improved after surgery but continues to have some loss and notices the differences in hearing between the two ears. Denies having any communication difficulties but have issues with localizing sound and in noisy environment.       Past Medical History:   Diagnosis Date    Skull fracture (H)     TBI (traumatic brain injury) (H)        Past Surgical History:   Procedure Laterality Date    LE FORT ONE , OSTEOTOMY SAGITTAL SPLIT, COMBINED Bilateral 2023    Procedure: LEFORT 1 OSTEOTOMY BILATERAL SAGITTAL SPLIT OSTEOTOMY;  Surgeon: Morris Barr DDS;  Location:  OR    TYMPANOPLASTY Right 2022    Procedure: right postauricular revision tympanoplasty;  Surgeon: Mayra Sanders MD;  Location: [facility] OR       No family history on file.    Social History     Tobacco Use    Smoking status: Some Days    Smokeless tobacco: Never         Patient Supplied Answers to Review  "of Systems      6/30/2023     9:32 AM   [facility] ROS   Ears, Nose, Throat Hearing loss    Ringing/noise in ears    Nasal congestion or drainage      The remainder of the 10 point review of systems is otherwise negative.    Physical Exam:   /75   Pulse 61   Ht 1.905 m (6' 3\")   Wt 86.6 kg (191 lb)   BMI 23.87 kg/m    Constitutional:  The patient was accompanied by his mother, well-groomed, and in no acute distress.     Skin: Normal:  warm and pink without rash   Neurologic: Alert and oriented x 3.  CN's III-XII within normal limits.  Voice normal.    Psychiatric: The patient's affect was calm, cooperative, and appropriate.     Communication:  Normal; communicates verbally, normal voice quality.   Respiratory: Breathing comfortably without stridor or exertion of accessory muscles.    Ears: Pinnae and tragus non-tender.        Otologic microscope exam:    - Right ear: Ear canal with moderate cerumen laterally, debrided with curette. Ear canal incisions healed well. TM intact without perforation and retraction. There is a small degree of anterior blunting and elevation off of malleus handle umbo with a potential appearance of an additional layer underneath. Hector-TM is nicely thinned and we are able to visualize middle ear space well. There is no squamous debris tracking.   - Left ear:  Ear canal clear. TM intact and middle ear aerated.     Audiogram: Audiogram from today was independently reviewed.        Right: Speech reception threshold is 25 dB with 96 % word recognition. Tympanogram A type   Left: Speech reception threshold is 5 dB with 100 % word recognition. Tympanogram A type       Assessment and plan:  Christopher Wilson is a 23 year old male presenting with:    1) A history of traumatic right tympanic membrane perforation, s/p right lateral technique tympanoplasty (revision) with bony canalplasty 11/23/22  2) Right side borderline to mild conductive hearing loss due to 1)     We reviewed the exam and " audiogram findings in detail with the patient. We are pleased to see the right TM graft and ear canal skin that have healed well. He has continuing borderline to mild conductive hearing loss and exam with a small elevation of graft off of malleus handle and umbo. It is a reasonable option to consider a hearing aid to improve his hearing and sound localization. We reviewed various options including conventional hearing aid, bone conduction and over-the-counter hearing aid. We recommended a formal hearing aid consultation and trial with an audiologist. We will provide a hearing aid clearance form and also placed a referral/order. He will return with new or relevant symptoms as needed and for scheduled follow up in 1 year with an audiogram.    Kate De La Rosa MD MPH   Fellow Physician  Otology & Neurotology  [facility]     Mayra Sanders MD  Otology & Neurotology  [facility]    I, Mayra Sanders MD, saw this patient with the resident/fellow and agree with the resident s findings and plan of care as documented in the resident s/fellow s note. I was present for the entire procedure.              Again, thank you for allowing me to participate in the care of your patient.      Sincerely,    Mayra Sanders MD
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
## 2023-06-30 NOTE — NURSING NOTE
"Chief Complaint   Patient presents with     RECHECK     6 month follow up     Blood pressure 123/75, pulse 61, height 1.905 m (6' 3\"), weight 86.6 kg (191 lb).    Jaswant Reyes LPN    "

## 2023-06-30 NOTE — PATIENT INSTRUCTIONS
You were seen in the ENT Clinic today by Dr. Sanders. If you have any questions or concerns after your appointment, please contact us (see below)      2.   Please return to clinic in one year with audiogram prior.         How to Contact Us:  Send a [employer] message to your provider. Our team will respond to you via [employer]. Occasionally, we will need to call you to get further information.  For urgent matters (Monday-Friday), call the ENT Clinic: [phone] and speak with a call center team member - they will route your call appropriately.   If you'd like to speak directly with a nurse, please find our contact information below. We do our best to check voicemail frequently throughout the day, and will work to call you back within 1-2 days. For urgent matters, please use the general clinic phone numbers listed above.      Mena WILSON RN  ENT RN Care Coordinator  Direct: [phone]    Rhonda FOWLER LPN  Direct: [phone]

## 2023-08-24 ENCOUNTER — OFFICE VISIT (OUTPATIENT)
Dept: AUDIOLOGY | Facility: CLINIC | Age: 23
End: 2023-08-24
Payer: COMMERCIAL

## 2023-08-24 DIAGNOSIS — H90.11 CONDUCTIVE HEARING LOSS OF RIGHT EAR WITH UNRESTRICTED HEARING OF LEFT EAR: ICD-10-CM

## 2023-08-24 PROCEDURE — 92590 PR HEARING AID EXAM MONAURAL: CPT | Mod: RT | Performed by: AUDIOLOGIST
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
## 2023-10-16 ENCOUNTER — OFFICE VISIT (OUTPATIENT)
Dept: AUDIOLOGY | Facility: CLINIC | Age: 23
End: 2023-10-16
Payer: COMMERCIAL

## 2023-10-16 DIAGNOSIS — H90.11 CONDUCTIVE HEARING LOSS OF RIGHT EAR WITH UNRESTRICTED HEARING OF LEFT EAR: Primary | ICD-10-CM

## 2023-10-16 PROCEDURE — V5257 HEARING AID, DIGIT, MON, BTE: HCPCS | Mod: RT | Performed by: AUDIOLOGIST

## 2023-10-16 PROCEDURE — V5241 DISPENSING FEE, MONAURAL: HCPCS | Mod: RT | Performed by: AUDIOLOGIST

## 2023-10-16 PROCEDURE — V5011 HEARING AID FITTING/CHECKING: HCPCS | Performed by: AUDIOLOGIST

## 2023-10-16 PROCEDURE — V5020 CONFORMITY EVALUATION: HCPCS | Performed by: AUDIOLOGIST

## 2023-10-16 NOTE — PROGRESS NOTES
AUDIOLOGY REPORT    SUBJECTIVE: Saul Wilson is a 23 year old male who was seen in the Audiology Clinic at the [facility] and [facility] for a fitting of a right Phonak Audeo L70R hearing aid. Previous results have revealed a right unilateral borderline normal to mild conductive hearing loss. The patient was given medical clearance to pursue amplification by  Mayra Sanders MD.. He is a new user of hearing aids. Of note, the patient has a right eardrum perforation.  OBJECTIVE: The hearing aid conformity evaluation was completed.The hearing aids were placed and they provided a good fit. Real-ear-probe-microphone measurements were completed on the [employer] system and were a good match to NAL-NL1 target with soft sounds audible, moderate sounds comfortable, and loud sounds below discomfort. UCLs are verified through maximum power output measures and demonstrate appropriate limiting of loud inputs. Target gain was close but not fully met at 250-500 Hz, gain would be increased in the software but no change was reflected on the measurement.    Saul was oriented to proper hearing aid use, care, cleaning (no water, dry brush), batteries (size: BATTERY SIZE: rechargeable, insertion/removal, toxicity, low-battery signal), aid insertion/removal, user booklet, warranty information, storage cases, and other hearing aid details. The patient confirmed understanding of hearing aid use and care, and showed proper insertion of hearing aid and batteries while in the office today. Gain was set to 85% and will increased to 100% in about 2 weeks. Saul reported good volume and sound quality today.   Hearing aids were programmed as follows:  Program 1:AutoSense  Toggle active for on/off and answering phone calls. Touch control is deactivated    EAR(S) FIT: Right  HEARING AID MODEL NAME: Phonak Audeo L70R  HEARING AID STYLE: -in-the-ear behind-the-ear  EARMOLDS/TIP: medium open  pablito  SERIAL NUMBERS: Right: [id number]   WARRANTY END DATE: 11/22/2026  The hearing aid was paired to his iPhone for streaming and connected to his yao. A brief review was performed and he expressed understanding.    ASSESSMENT: A right hearing aid was fit today. Verification measures were performed. Saul signed the Hearing Aid Purchase Agreement and was given a copy, as well as details on his hearing aids. Patient was counseled that exact out of pocket amounts cannot be determined for hearing aid claims being sent to insurance. Any insurance coverage information presented to the patient is an estimate only, and is not a guarantee of payment. Patient has been advised to check with their own insurance.    PLAN:Saul will return for follow-up in 2-3 weeks for a hearing aid review appointment. Please call this clinic with questions regarding today s appointment.    Ajit Brown  Audiologist  [place] License  #[id number]

## 2024-05-30 ENCOUNTER — TELEPHONE (OUTPATIENT)
Dept: OTOLARYNGOLOGY | Facility: CLINIC | Age: 24
End: 2024-05-30
Payer: COMMERCIAL

## (undated) DEVICE — ESU GROUND PAD ADULT W/CORD E7507

## (undated) DEVICE — DRAPE MICROSCOPE LEICA 54X150" AR8033650

## (undated) DEVICE — SUCTION MANIFOLD NEPTUNE 2 SYS 4 PORT 0702-020-000

## (undated) DEVICE — Device

## (undated) DEVICE — GLOVE LINER FINGER 1/2

## (undated) DEVICE — WIRE SURGICAL STEEL 26GA 0 DS-26: Type: IMPLANTABLE DEVICE | Site: MOUTH | Status: NON-FUNCTIONAL

## (undated) DEVICE — DRILL TWIST STRK 1.6MM XLG 9216820

## (undated) DEVICE — ADH LIQUID MASTISOL TOPICAL VIAL 2-3ML 0523-48

## (undated) DEVICE — LINEN TOWEL PACK X5 5464

## (undated) DEVICE — PREP PAD ALCOHOL 6818

## (undated) DEVICE — SU VICRYL 4-0 PS-2 18" UND J496H

## (undated) DEVICE — DRAPE U SPLIT 74X120" 29440

## (undated) DEVICE — BLADE SAW OSCIL/SAG STRK MICRO 9.0X18.5X0.38MM 2296-003-105

## (undated) DEVICE — NIM ELEC SUBDERMAL NDL 3PAIR/BOX

## (undated) DEVICE — DRILL TWIST STRK 1.35X50MM 5MM STOP 6013505

## (undated) DEVICE — BLADE SAW SAGITTAL STRK MICRO 9.0X25X0.38MM 2296-003-511

## (undated) DEVICE — PACK EAR CUSTOM ASC

## (undated) DEVICE — DRAPE STERI TOWEL SM 1000

## (undated) DEVICE — BUR STRK ROUND DIAMOND 2.0MM FINE 5540-011-020

## (undated) DEVICE — PREP SKIN SCRUB TRAY 4461A

## (undated) DEVICE — DECANTER VIAL 2006S

## (undated) DEVICE — SOL WATER IRRIG 1000ML BOTTLE 2F7114

## (undated) DEVICE — BUR STRK ROUND DIAMOND 2.0MM COARSE EXT 5540-013-320

## (undated) DEVICE — NDL 27GA 1.25" 305136

## (undated) DEVICE — TONGUE DEPRESSOR STERILE 25-705-ALL

## (undated) DEVICE — LINEN GOWN XLG 5407

## (undated) DEVICE — ESU PENCIL W/HOLSTER

## (undated) DEVICE — SU VICRYL 2-0 X-1 27" UND J459H

## (undated) DEVICE — PREP POVIDONE IODINE SOLUTION 10% 4OZ BOTTLE 29906-004

## (undated) DEVICE — BUR ROUND 4MM CARBIDE 5091-228

## (undated) DEVICE — DRAPE POUCH IRR 1016

## (undated) DEVICE — PREP POVIDONE-IODINE 7.5% SCRUB 4OZ BOTTLE MDS093945

## (undated) DEVICE — DRAPE STOCKINETTE IMPERVIOUS 10" 21048

## (undated) DEVICE — SPONGE SURGIFOAM 100 1974

## (undated) DEVICE — BLADE KNIFE BEAVER TYMPANOPLASTY 2.5MM W/60D DOWN 377200

## (undated) DEVICE — SU VICRYL 4-0 PC-3 18" UND J845G

## (undated) DEVICE — WIPE INSTRUMENT MEROCEL 400200

## (undated) DEVICE — SPONGE PACK VAGINAL 2X36"

## (undated) DEVICE — ESU ELEC BLADE 2.75" COATED/INSULATED E1455

## (undated) DEVICE — DRSG TEGADERM 2 3/8X2 3/4" 1624W

## (undated) DEVICE — SOL WATER IRRIG 500ML BOTTLE 2F7113

## (undated) DEVICE — BUR ROUND CUT LINVATEC 2X45MM 5091-224

## (undated) DEVICE — DRSG GLASSCOCK ADULT S-1000

## (undated) DEVICE — DRAPE WARMER 66X44" ORS-300

## (undated) DEVICE — SOL NACL 0.9% IRRIG 1000ML BOTTLE 2F7124

## (undated) DEVICE — PACK HEAD NECK SEN15HNFSF

## (undated) DEVICE — DRILL BIT TWIST 2.0X1.6X5MM 60-16005

## (undated) DEVICE — BUR WIREPASS 1X19MM 5091-247

## (undated) DEVICE — SURGICEL HEMOSTAT 3X4" NUKNIT 1943

## (undated) DEVICE — BUR STRK ROUND DIAMOND 3.0MM COARSE EXT 5540-013-330

## (undated) DEVICE — ESU NDL COLORADO MICRO 3CM STR N103A

## (undated) DEVICE — ESU GROUND PAD UNIVERSAL W/O CORD

## (undated) DEVICE — BUR OVAL LINVATEC 4X8MM 5091-236

## (undated) DEVICE — GLOVE GAMMEX NEOPRENE ULTRA SZ 7 LF 8514

## (undated) DEVICE — SYR 01ML TBC SLIP TIP W/O NDL

## (undated) DEVICE — VSP ORTHOGNATHIC BUNDLE VSPORTHOG

## (undated) DEVICE — DRSG TEGADERM 4X4 3/4" 1626W

## (undated) DEVICE — SOL NACL 0.9% IRRIG 500ML BOTTLE 2F7123

## (undated) RX ORDER — METHYLPREDNISOLONE SODIUM SUCCINATE 125 MG/2ML
INJECTION, POWDER, LYOPHILIZED, FOR SOLUTION INTRAMUSCULAR; INTRAVENOUS
Status: DISPENSED
Start: 2023-06-01

## (undated) RX ORDER — PROPOFOL 10 MG/ML
INJECTION, EMULSION INTRAVENOUS
Status: DISPENSED
Start: 2023-06-01

## (undated) RX ORDER — CHLORHEXIDINE GLUCONATE ORAL RINSE 1.2 MG/ML
SOLUTION DENTAL
Status: DISPENSED
Start: 2023-06-01

## (undated) RX ORDER — FENTANYL CITRATE 50 UG/ML
INJECTION, SOLUTION INTRAMUSCULAR; INTRAVENOUS
Status: DISPENSED
Start: 2022-11-23

## (undated) RX ORDER — PROPOFOL 10 MG/ML
INJECTION, EMULSION INTRAVENOUS
Status: DISPENSED
Start: 2022-11-23

## (undated) RX ORDER — LABETALOL HYDROCHLORIDE 5 MG/ML
INJECTION, SOLUTION INTRAVENOUS
Status: DISPENSED
Start: 2023-06-01

## (undated) RX ORDER — DEXMEDETOMIDINE HYDROCHLORIDE 4 UG/ML
INJECTION, SOLUTION INTRAVENOUS
Status: DISPENSED
Start: 2022-11-23

## (undated) RX ORDER — FENTANYL CITRATE 0.05 MG/ML
INJECTION, SOLUTION INTRAMUSCULAR; INTRAVENOUS
Status: DISPENSED
Start: 2023-06-01

## (undated) RX ORDER — HYDROMORPHONE HCL IN WATER/PF 6 MG/30 ML
PATIENT CONTROLLED ANALGESIA SYRINGE INTRAVENOUS
Status: DISPENSED
Start: 2023-06-01

## (undated) RX ORDER — HYDROMORPHONE HYDROCHLORIDE 1 MG/ML
INJECTION, SOLUTION INTRAMUSCULAR; INTRAVENOUS; SUBCUTANEOUS
Status: DISPENSED
Start: 2022-11-23

## (undated) RX ORDER — LIDOCAINE HYDROCHLORIDE AND EPINEPHRINE 10; 10 MG/ML; UG/ML
INJECTION, SOLUTION INFILTRATION; PERINEURAL
Status: DISPENSED
Start: 2022-11-23

## (undated) RX ORDER — EPINEPHRINE NASAL SOLUTION 1 MG/ML
SOLUTION NASAL
Status: DISPENSED
Start: 2022-11-23

## (undated) RX ORDER — CEFUROXIME SODIUM 1.5 G/16ML
INJECTION, POWDER, FOR SOLUTION INTRAVENOUS
Status: DISPENSED
Start: 2022-11-23

## (undated) RX ORDER — DEXAMETHASONE SODIUM PHOSPHATE 10 MG/ML
INJECTION, SOLUTION INTRAMUSCULAR; INTRAVENOUS
Status: DISPENSED
Start: 2022-11-23

## (undated) RX ORDER — CIPROFLOXACIN AND DEXAMETHASONE 3; 1 MG/ML; MG/ML
SUSPENSION/ DROPS AURICULAR (OTIC)
Status: DISPENSED
Start: 2022-11-23

## (undated) RX ORDER — REMIFENTANIL HYDROCHLORIDE 1 MG/ML
INJECTION, POWDER, LYOPHILIZED, FOR SOLUTION INTRAVENOUS
Status: DISPENSED
Start: 2023-06-01

## (undated) RX ORDER — REMIFENTANIL HYDROCHLORIDE 1 MG/ML
INJECTION, POWDER, LYOPHILIZED, FOR SOLUTION INTRAVENOUS
Status: DISPENSED
Start: 2022-11-23

## (undated) RX ORDER — OFLOXACIN 3 MG/ML
SOLUTION/ DROPS OPHTHALMIC
Status: DISPENSED
Start: 2022-11-23

## (undated) RX ORDER — HYDROMORPHONE HYDROCHLORIDE 1 MG/ML
INJECTION, SOLUTION INTRAMUSCULAR; INTRAVENOUS; SUBCUTANEOUS
Status: DISPENSED
Start: 2023-06-01

## (undated) RX ORDER — FENTANYL CITRATE 50 UG/ML
INJECTION, SOLUTION INTRAMUSCULAR; INTRAVENOUS
Status: DISPENSED
Start: 2023-06-01

## (undated) RX ORDER — HYDROCODONE BITARTRATE AND ACETAMINOPHEN 5; 325 MG/1; MG/1
TABLET ORAL
Status: DISPENSED
Start: 2022-11-23

## (undated) RX ORDER — ONDANSETRON 2 MG/ML
INJECTION INTRAMUSCULAR; INTRAVENOUS
Status: DISPENSED
Start: 2022-11-23

## (undated) RX ORDER — BENZOCAINE/MENTHOL 6 MG-10 MG
LOZENGE MUCOUS MEMBRANE
Status: DISPENSED
Start: 2023-06-01